# Patient Record
Sex: FEMALE | Race: WHITE | NOT HISPANIC OR LATINO | URBAN - METROPOLITAN AREA
[De-identification: names, ages, dates, MRNs, and addresses within clinical notes are randomized per-mention and may not be internally consistent; named-entity substitution may affect disease eponyms.]

---

## 2018-02-09 ENCOUNTER — INPATIENT (INPATIENT)
Facility: HOSPITAL | Age: 83
LOS: 4 days | Discharge: NURSING, REHAB OR LONG-TERM CARE | DRG: 593 | End: 2018-02-14
Attending: INTERNAL MEDICINE | Admitting: INTERNAL MEDICINE
Payer: COMMERCIAL

## 2018-02-09 VITALS
RESPIRATION RATE: 18 BRPM | DIASTOLIC BLOOD PRESSURE: 56 MMHG | TEMPERATURE: 98 F | OXYGEN SATURATION: 99 % | HEART RATE: 74 BPM | SYSTOLIC BLOOD PRESSURE: 142 MMHG

## 2018-02-09 DIAGNOSIS — L03.90 CELLULITIS, UNSPECIFIED: ICD-10-CM

## 2018-02-09 LAB
ALBUMIN SERPL ELPH-MCNC: 2.6 G/DL — LOW (ref 3.5–5)
ALP SERPL-CCNC: 85 U/L — SIGNIFICANT CHANGE UP (ref 40–120)
ALT FLD-CCNC: 37 U/L DA — SIGNIFICANT CHANGE UP (ref 10–60)
ANION GAP SERPL CALC-SCNC: 5 MMOL/L — SIGNIFICANT CHANGE UP (ref 5–17)
APTT BLD: 34.3 SEC — SIGNIFICANT CHANGE UP (ref 27.5–37.4)
AST SERPL-CCNC: 63 U/L — HIGH (ref 10–40)
BASE EXCESS BLDV CALC-SCNC: 5.9 MMOL/L — HIGH (ref -2–2)
BASOPHILS # BLD AUTO: 0 K/UL — SIGNIFICANT CHANGE UP (ref 0–0.2)
BASOPHILS NFR BLD AUTO: 0.3 % — SIGNIFICANT CHANGE UP (ref 0–2)
BILIRUB SERPL-MCNC: 0.6 MG/DL — SIGNIFICANT CHANGE UP (ref 0.2–1.2)
BLOOD GAS COMMENTS, VENOUS: SIGNIFICANT CHANGE UP
BUN SERPL-MCNC: 43 MG/DL — HIGH (ref 7–18)
CALCIUM SERPL-MCNC: 8.5 MG/DL — SIGNIFICANT CHANGE UP (ref 8.4–10.5)
CHLORIDE SERPL-SCNC: 100 MMOL/L — SIGNIFICANT CHANGE UP (ref 96–108)
CO2 SERPL-SCNC: 33 MMOL/L — HIGH (ref 22–31)
CREAT SERPL-MCNC: 1.45 MG/DL — HIGH (ref 0.5–1.3)
EOSINOPHIL # BLD AUTO: 0 K/UL — SIGNIFICANT CHANGE UP (ref 0–0.5)
EOSINOPHIL NFR BLD AUTO: 0.1 % — SIGNIFICANT CHANGE UP (ref 0–6)
GLUCOSE SERPL-MCNC: 121 MG/DL — HIGH (ref 70–99)
HCO3 BLDV-SCNC: 32 MMOL/L — HIGH (ref 21–29)
HCT VFR BLD CALC: 37.8 % — SIGNIFICANT CHANGE UP (ref 34.5–45)
HGB BLD-MCNC: 11.6 G/DL — SIGNIFICANT CHANGE UP (ref 11.5–15.5)
INR BLD: 1.13 RATIO — SIGNIFICANT CHANGE UP (ref 0.88–1.16)
LACTATE SERPL-SCNC: 2.4 MMOL/L — HIGH (ref 0.7–2)
LIDOCAIN IGE QN: 237 U/L — SIGNIFICANT CHANGE UP (ref 73–393)
LYMPHOCYTES # BLD AUTO: 1.4 K/UL — SIGNIFICANT CHANGE UP (ref 1–3.3)
LYMPHOCYTES # BLD AUTO: 9 % — LOW (ref 13–44)
MCHC RBC-ENTMCNC: 28.6 PG — SIGNIFICANT CHANGE UP (ref 27–34)
MCHC RBC-ENTMCNC: 30.6 GM/DL — LOW (ref 32–36)
MCV RBC AUTO: 93.4 FL — SIGNIFICANT CHANGE UP (ref 80–100)
MONOCYTES # BLD AUTO: 0.7 K/UL — SIGNIFICANT CHANGE UP (ref 0–0.9)
MONOCYTES NFR BLD AUTO: 4.6 % — SIGNIFICANT CHANGE UP (ref 2–14)
NEUTROPHILS # BLD AUTO: 13.7 K/UL — HIGH (ref 1.8–7.4)
NEUTROPHILS NFR BLD AUTO: 86 % — HIGH (ref 43–77)
PCO2 BLDV: 54 MMHG — HIGH (ref 35–50)
PH BLDV: 7.39 — SIGNIFICANT CHANGE UP (ref 7.35–7.45)
PLATELET # BLD AUTO: 197 K/UL — SIGNIFICANT CHANGE UP (ref 150–400)
PO2 BLDV: SIGNIFICANT CHANGE UP MMHG (ref 25–45)
POTASSIUM SERPL-MCNC: 3.8 MMOL/L — SIGNIFICANT CHANGE UP (ref 3.5–5.3)
POTASSIUM SERPL-SCNC: 3.8 MMOL/L — SIGNIFICANT CHANGE UP (ref 3.5–5.3)
PROT SERPL-MCNC: 6.5 G/DL — SIGNIFICANT CHANGE UP (ref 6–8.3)
PROTHROM AB SERPL-ACNC: 12.3 SEC — SIGNIFICANT CHANGE UP (ref 9.8–12.7)
RBC # BLD: 4.04 M/UL — SIGNIFICANT CHANGE UP (ref 3.8–5.2)
RBC # FLD: 13 % — SIGNIFICANT CHANGE UP (ref 10.3–14.5)
SAO2 % BLDV: 36 % — LOW (ref 67–88)
SODIUM SERPL-SCNC: 138 MMOL/L — SIGNIFICANT CHANGE UP (ref 135–145)
TROPONIN I SERPL-MCNC: 0.04 NG/ML — SIGNIFICANT CHANGE UP (ref 0–0.04)
WBC # BLD: 15.9 K/UL — HIGH (ref 3.8–10.5)
WBC # FLD AUTO: 15.9 K/UL — HIGH (ref 3.8–10.5)

## 2018-02-09 PROCEDURE — 73590 X-RAY EXAM OF LOWER LEG: CPT | Mod: 26,LT

## 2018-02-09 PROCEDURE — 71045 X-RAY EXAM CHEST 1 VIEW: CPT | Mod: 26

## 2018-02-09 PROCEDURE — 70450 CT HEAD/BRAIN W/O DYE: CPT | Mod: 26

## 2018-02-09 PROCEDURE — 99285 EMERGENCY DEPT VISIT HI MDM: CPT | Mod: 25

## 2018-02-09 PROCEDURE — 72170 X-RAY EXAM OF PELVIS: CPT | Mod: 26

## 2018-02-09 RX ORDER — SODIUM CHLORIDE 9 MG/ML
1000 INJECTION INTRAMUSCULAR; INTRAVENOUS; SUBCUTANEOUS ONCE
Qty: 0 | Refills: 0 | Status: COMPLETED | OUTPATIENT
Start: 2018-02-09 | End: 2018-02-09

## 2018-02-09 RX ORDER — ACETAMINOPHEN 500 MG
650 TABLET ORAL ONCE
Qty: 0 | Refills: 0 | Status: COMPLETED | OUTPATIENT
Start: 2018-02-09 | End: 2018-02-09

## 2018-02-09 RX ORDER — VANCOMYCIN HCL 1 G
750 VIAL (EA) INTRAVENOUS ONCE
Qty: 0 | Refills: 0 | Status: COMPLETED | OUTPATIENT
Start: 2018-02-09 | End: 2018-02-10

## 2018-02-09 RX ORDER — SODIUM CHLORIDE 9 MG/ML
3 INJECTION INTRAMUSCULAR; INTRAVENOUS; SUBCUTANEOUS ONCE
Qty: 0 | Refills: 0 | Status: COMPLETED | OUTPATIENT
Start: 2018-02-09 | End: 2018-02-09

## 2018-02-09 RX ADMIN — SODIUM CHLORIDE 9999 MILLILITER(S): 9 INJECTION INTRAMUSCULAR; INTRAVENOUS; SUBCUTANEOUS at 22:50

## 2018-02-09 RX ADMIN — SODIUM CHLORIDE 3 MILLILITER(S): 9 INJECTION INTRAMUSCULAR; INTRAVENOUS; SUBCUTANEOUS at 20:40

## 2018-02-09 NOTE — ED PROVIDER NOTE - CONSTITUTIONAL, MLM
normal... Pt is cachetic and dehydrated appearing;  awake, alert, oriented to person, place, time/situation and in no apparent distress.

## 2018-02-09 NOTE — H&P ADULT - EXTREMITIES COMMENTS
bilateral lower extremity warmth and erythema.  L LE cellulitis with draining ulcers in posterior and anterior aspect at the level of tibia and fibula. Sock in place attached to skin, extremely dry.  Onychomicosis of toes with dirt in nailbeds

## 2018-02-09 NOTE — H&P ADULT - PROBLEM SELECTOR PLAN 2
Left leg cellulitis with infected ulcers and blisters.  WBC: 15.9 w/ left shift, lactate. 2.4, afebrile  s/p 1L NS bolus + Vancomycin 750 mg IVPB once  Continue with Vancomycin 1g IVPB q24 hrs  f/up BCx  f/up left tibial XR  f/up TONIE  Podiatry consulted  Will need Vascular consult  ID consult: Left leg cellulitis with infected ulcers and blisters.  WBC: 15.9 w/ left shift, lactate. 2.4, afebrile  s/p 1L NS bolus + Vancomycin 750 mg IVPB once  Continue with Vancomycin 1g IVPB q24 hrs  f/up BCx  f/up left tibial XR  f/up TONIE  Podiatry consulted  ID consulted: Dr De Luna Left leg cellulitis with infected ulcers and blisters.  WBC: 15.9 w/ left shift, lactate. 2.4, afebrile  s/p 1L NS bolus + Vancomycin 750 mg IVPB once  Continue with Vancomycin 1g IVPB q24 hrs + Zosyn for now  f/up BCx  f/up left tibial XR  f/up TONIE  f/up ESR, CRP  Podiatry consulted  ID consulted: Dr De Luna Left leg cellulitis with infected ulcers and blisters.  WBC: 15.9 w/ left shift, lactate. 2.4, afebrile  s/p 1L NS bolus + Vancomycin 750 mg IVPB once  Continue with Vancomycin 1g IVPB q24 hrs + Zosyn for now  f/up BCx  f/up left tibial XR: gas not appreciated, however f/up results  f/up TONIE  f/up ESR, CRP  Podiatry consulted  ID consulted: Dr De Luna

## 2018-02-09 NOTE — H&P ADULT - HISTORY OF PRESENT ILLNESS
Nenita Estrada is an 87F, from home (lives alone), independent at baseline, PMHx HTN, HLD. OA, varicose veins, who presented to ED s/p mechanical fall, as per patient she tripped with the bathroom rug, legs "gave out" and could not stand up due to left leg pain, thereafter, patient screamed for help and daughter came to assist her. Patient denies LOC, chest pain, palpitations, vertigo during fall event.  As per daughter, patient has been limping from left leg for a couple of weeks. They were not "aware" of patients leg infection prior to fall. Patient refers wearing a sock on left foot for "1 week" "because she noticed infection and thought it'll get better with sock". Patient was asked if she washes her legs when showering, to which she was not able to respond clearly. She refers that daughter helps her. Patient denies fever/chills, diarrhea. Refers pain 5/10 left leg. Patient is alert and oriented to time and space but is a poor historian.    Spoke over the phone with daughter, who was not aware of severity of leg infection. She refers that infection is 2/2 fall she had yesterday at home. Daughter was informed that infection most likely has been going on for several weeks now and that patient looks like she has not removed sock for at least 2 weeks, as it was glued to skin and feet with poor hygiene. Daughter refers that patient never reported "leg issues". Daughter denies history of peripheral vascular disease, but does report that patient was once hospitalized for leg infection several years ago.  Upon bedside assessment, patient was poorly groomed, nailbeds with dirt, bilateral lower extremities with erythema, warmth. Left leg with draining ulcers in lateral and posterior side, malodorous, onychomycosis bilaterally, with darkening of skin and dirt. Sock was removed with surgical scissors after soaking with saline water.       ED course: BP:142/56 mmHg, HR: 74 bpm, RR: 18 rpm, T: 97.7F, Sao2: 99% RA  CBC significant for WBC: 15.9 with left shift. Lactate 2.5  s/p 1L NS bolus + Vanco 750mg IVPB once  CT head w/o co: no acute events Nenita Estrada is an 87F, from home (lives alone), independent at baseline, PMHx HTN, HLD. OA, varicose veins, who presented to ED s/p mechanical fall, as per patient she tripped with the bathroom rug, legs "gave out" and could not stand up due to left leg pain, thereafter, patient screamed for help and daughter came to assist her. Patient denies LOC, chest pain, palpitations, vertigo during fall event.  As per daughter, patient has been limping from left leg for a couple of weeks. They were not "aware" of patients leg infection prior to fall. Patient refers wearing a sock on left foot for "1 week" "because she noticed infection and thought it'll get better with sock". Patient was asked if she washes her legs when showering, to which she was not able to respond clearly. She refers that daughter helps her. Patient denies fever/chills, diarrhea. Refers pain 5/10 left leg. Patient is alert and oriented to time and space but is a poor historian.    Spoke over the phone with daughter, who was not aware of severity of leg infection. She refers that infection is 2/2 fall she had yesterday at home. Daughter was informed that infection most likely has been going on for several weeks now and that patient looks like she has not removed sock for at least 2 weeks, as it was glued to skin and feet with poor hygiene. Daughter refers that patient never reported "leg issues". Daughter denies history of peripheral vascular disease, but does report that patient was once hospitalized for leg infection several years ago.  Upon bedside assessment, patient was poorly groomed, nailbeds with dirt, bilateral lower extremities with erythema, warmth. Left leg with draining ulcers in lateral and posterior side, malodorous, onychomycosis bilaterally, with darkening of skin and dirt. Sock was removed with surgical scissors after soaking with saline water.       ED course: BP:142/56 mmHg, HR: 74 bpm, RR: 18 rpm, T: 97.7F, Sao2: 99% RA  CBC significant for WBC: 15.9 with left shift. Lactate 2.5  s/p 1L NS bolus + Vanco 750mg IVPB once  CT head w/o co: no acute events  EKG: NSR VR@70bpm Nenita Estrada is an 87F, from home (lives alone), independent at baseline, PMHx HTN, HLD. OA, varicose veins, who presented to ED s/p mechanical fall, as per patient she tripped with the bathroom rug, legs "gave out" and could not stand up due to left leg pain, thereafter, patient screamed for help and daughter came to assist her. Patient denies LOC, chest pain, palpitations, vertigo during fall event.  As per daughter, patient has been limping from left leg for a couple of days. They were not "aware" of patients leg infection prior to fall. Patient refers wearing a sock on left foot for "1 week" "because she noticed infection and thought it'll get better with sock". Patient was asked if she washes her legs when showering, to which she was not able to respond clearly. She refers that daughter helps her. Patient denies fever/chills, diarrhea. Refers pain 5/10 left leg. Patient is alert and oriented to time and space but is a poor historian.    Spoke over the phone with daughter, who was not aware of severity of leg infection. She refers that infection is 2/2 fall she had yesterday at home. Daughter was informed that infection most likely has been going on for several weeks now and that patient looks like she has not removed sock for at least 2 weeks, as it was glued to skin and feet with poor hygiene. Daughter refers that patient never reported "leg issues". Daughter denies history of peripheral vascular disease, but does report that patient was once hospitalized for leg infection several years ago.  Upon bedside assessment, patient was poorly groomed, nailbeds with dirt, bilateral lower extremities with erythema, warmth. Left leg with draining ulcers in lateral and posterior side, malodorous, onychomycosis bilaterally, with darkening of skin and dirt. Sock was removed with surgical scissors after soaking with saline water.       ED course: BP:142/56 mmHg, HR: 74 bpm, RR: 18 rpm, T: 97.7F, Sao2: 99% RA  CBC significant for WBC: 15.9 with left shift. Lactate 2.5  s/p 1L NS bolus + Vanco 750mg IVPB once  CT head w/o co: no acute events  EKG: NSR VR@70bpm

## 2018-02-09 NOTE — ED PROVIDER NOTE - OBJECTIVE STATEMENT
86 y/o F pt w/ a PMhx of Htn, varicose veins in the legs c/o fall x yesterday. Daughters at the bedside; state that pt has had severe difficulty walking since fall. Report that pt's L leg is covered in sores; believe this is somehow related to the fall. Deny LOC and any other complaints. NKDA.

## 2018-02-09 NOTE — H&P ADULT - ASSESSMENT
Nenita Estrada is an 87F, from home (lives alone), independent at baseline, PMHx HTN, HLD. OA, varicose veins, who presented to ED s/p mechanical fall, as per patient she tripped with the bathroom rug, legs "gave out" and could not stand up due to left leg pain, thereafter, patient screamed for help and daughter came to assist her. Patient denies LOC, chest pain, palpitations, vertigo during fall event.  As per daughter, patient has been limping from left leg for a couple of weeks. They were not "aware" of patients leg infection prior to fall. Patient refers wearing a sock on left foot for "1 week" "because she noticed infection and thought it'll get better with sock". Patient was asked if she washes her legs when showering, to which she was not able to respond clearly. She refers that daughter helps her. Patient denies fever/chills, diarrhea. Refers pain 5/10 left leg. Patient is alert and oriented to time and space but is a poor historian.      Patient admitted for FTT and left leg cellulitis with infected ulcers.

## 2018-02-09 NOTE — H&P ADULT - ATTENDING COMMENTS
Seen and examined in ED with two daughters by bedside. Will do Syncope work up as was  found on floor . Will treat with abxs for left leg  infection . Wound care and ID consults in AM. Will get  to help with safe discharge as lives by herself with daughters checking on her .

## 2018-02-09 NOTE — H&P ADULT - PROBLEM SELECTOR PLAN 1
Patient lives alone, can not take care of self  s/p mechanical fall, found to have lower extremity cellulitis w/ infected ulcers, unable to ambulate  SW consult to evaluate living situation  CT head w/o co: no acute events  f/up pelvic XR Patient lives alone, can not take care of self  s/p mechanical fall, found to have lower extremity cellulitis w/ infected ulcers, unable to ambulate  SW consult to evaluate living situation  CT head w/o co: no acute events  f/up pelvic XR  PT evaluation

## 2018-02-09 NOTE — H&P ADULT - PROBLEM SELECTOR PLAN 3
Lopressor 50 mg BID at home  Holding antihypertensives as blood pressure is borderline low  Monitor blood pressure and start medications if clinically indicated.

## 2018-02-09 NOTE — H&P ADULT - PROBLEM SELECTOR PLAN 4
IMPROVE VTE Individual Risk Assessment          RISK                                                          Points  [  ] Previous VTE                                                3  [  ] Thrombophilia                                             2  [ x ] Lower limb paralysis                                   2        (unable to hold up >15 seconds)    [  ] Current Cancer                                             2         (within 6 months)  [ x ] Immobilization > 24 hrs                              1  [  ] ICU/CCU stay > 24 hours                             1  [ x ] Age > 60                                                         1    IMPROVE VTE Score: 4  HSQ 5KU q8hrs for DVT chemoppx

## 2018-02-10 DIAGNOSIS — Z29.9 ENCOUNTER FOR PROPHYLACTIC MEASURES, UNSPECIFIED: ICD-10-CM

## 2018-02-10 DIAGNOSIS — I10 ESSENTIAL (PRIMARY) HYPERTENSION: ICD-10-CM

## 2018-02-10 DIAGNOSIS — L03.90 CELLULITIS, UNSPECIFIED: ICD-10-CM

## 2018-02-10 DIAGNOSIS — R62.7 ADULT FAILURE TO THRIVE: ICD-10-CM

## 2018-02-10 LAB
ALBUMIN SERPL ELPH-MCNC: 2.1 G/DL — LOW (ref 3.5–5)
ALP SERPL-CCNC: 70 U/L — SIGNIFICANT CHANGE UP (ref 40–120)
ALT FLD-CCNC: 32 U/L DA — SIGNIFICANT CHANGE UP (ref 10–60)
ANION GAP SERPL CALC-SCNC: 9 MMOL/L — SIGNIFICANT CHANGE UP (ref 5–17)
APPEARANCE UR: CLEAR — SIGNIFICANT CHANGE UP
AST SERPL-CCNC: 43 U/L — HIGH (ref 10–40)
BASOPHILS # BLD AUTO: 0 K/UL — SIGNIFICANT CHANGE UP (ref 0–0.2)
BASOPHILS NFR BLD AUTO: 0.3 % — SIGNIFICANT CHANGE UP (ref 0–2)
BILIRUB DIRECT SERPL-MCNC: 0.2 MG/DL — SIGNIFICANT CHANGE UP (ref 0–0.2)
BILIRUB INDIRECT FLD-MCNC: 0.3 MG/DL — SIGNIFICANT CHANGE UP (ref 0.2–1)
BILIRUB SERPL-MCNC: 0.5 MG/DL — SIGNIFICANT CHANGE UP (ref 0.2–1.2)
BILIRUB UR-MCNC: NEGATIVE — SIGNIFICANT CHANGE UP
BUN SERPL-MCNC: 33 MG/DL — HIGH (ref 7–18)
CALCIUM SERPL-MCNC: 7.7 MG/DL — LOW (ref 8.4–10.5)
CHLORIDE SERPL-SCNC: 104 MMOL/L — SIGNIFICANT CHANGE UP (ref 96–108)
CHOLEST SERPL-MCNC: 109 MG/DL — SIGNIFICANT CHANGE UP (ref 10–199)
CO2 SERPL-SCNC: 28 MMOL/L — SIGNIFICANT CHANGE UP (ref 22–31)
COLOR SPEC: YELLOW — SIGNIFICANT CHANGE UP
CREAT SERPL-MCNC: 1.09 MG/DL — SIGNIFICANT CHANGE UP (ref 0.5–1.3)
CRP SERPL-MCNC: 9.7 MG/DL — HIGH (ref 0–0.4)
DIFF PNL FLD: ABNORMAL
EOSINOPHIL # BLD AUTO: 0 K/UL — SIGNIFICANT CHANGE UP (ref 0–0.5)
EOSINOPHIL NFR BLD AUTO: 0 % — SIGNIFICANT CHANGE UP (ref 0–6)
ERYTHROCYTE [SEDIMENTATION RATE] IN BLOOD: 39 MM/HR — HIGH (ref 0–20)
GLUCOSE SERPL-MCNC: 143 MG/DL — HIGH (ref 70–99)
GLUCOSE UR QL: 50 MG/DL
HBA1C BLD-MCNC: 5.7 % — HIGH (ref 4–5.6)
HCT VFR BLD CALC: 30 % — LOW (ref 34.5–45)
HDLC SERPL-MCNC: 53 MG/DL — SIGNIFICANT CHANGE UP (ref 40–125)
HGB BLD-MCNC: 9.6 G/DL — LOW (ref 11.5–15.5)
KETONES UR-MCNC: NEGATIVE — SIGNIFICANT CHANGE UP
LACTATE SERPL-SCNC: 3.1 MMOL/L — HIGH (ref 0.7–2)
LEUKOCYTE ESTERASE UR-ACNC: NEGATIVE — SIGNIFICANT CHANGE UP
LIPID PNL WITH DIRECT LDL SERPL: 45 MG/DL — SIGNIFICANT CHANGE UP
LYMPHOCYTES # BLD AUTO: 0.8 K/UL — LOW (ref 1–3.3)
LYMPHOCYTES # BLD AUTO: 7.9 % — LOW (ref 13–44)
MAGNESIUM SERPL-MCNC: 2.2 MG/DL — SIGNIFICANT CHANGE UP (ref 1.6–2.6)
MCHC RBC-ENTMCNC: 30.1 PG — SIGNIFICANT CHANGE UP (ref 27–34)
MCHC RBC-ENTMCNC: 32.2 GM/DL — SIGNIFICANT CHANGE UP (ref 32–36)
MCV RBC AUTO: 93.6 FL — SIGNIFICANT CHANGE UP (ref 80–100)
MONOCYTES # BLD AUTO: 0.6 K/UL — SIGNIFICANT CHANGE UP (ref 0–0.9)
MONOCYTES NFR BLD AUTO: 6 % — SIGNIFICANT CHANGE UP (ref 2–14)
NEUTROPHILS # BLD AUTO: 9 K/UL — HIGH (ref 1.8–7.4)
NEUTROPHILS NFR BLD AUTO: 85.8 % — HIGH (ref 43–77)
NITRITE UR-MCNC: NEGATIVE — SIGNIFICANT CHANGE UP
PH UR: 5 — SIGNIFICANT CHANGE UP (ref 5–8)
PHOSPHATE SERPL-MCNC: 2.1 MG/DL — LOW (ref 2.5–4.5)
PLATELET # BLD AUTO: 145 K/UL — LOW (ref 150–400)
POTASSIUM SERPL-MCNC: 3.2 MMOL/L — LOW (ref 3.5–5.3)
POTASSIUM SERPL-SCNC: 3.2 MMOL/L — LOW (ref 3.5–5.3)
PROT SERPL-MCNC: 5.5 G/DL — LOW (ref 6–8.3)
PROT UR-MCNC: 30 MG/DL
RBC # BLD: 3.2 M/UL — LOW (ref 3.8–5.2)
RBC # FLD: 12.8 % — SIGNIFICANT CHANGE UP (ref 10.3–14.5)
SODIUM SERPL-SCNC: 141 MMOL/L — SIGNIFICANT CHANGE UP (ref 135–145)
SP GR SPEC: 1.02 — SIGNIFICANT CHANGE UP (ref 1.01–1.02)
TOTAL CHOLESTEROL/HDL RATIO MEASUREMENT: 2.1 RATIO — LOW (ref 3.3–7.1)
TRIGL SERPL-MCNC: 57 MG/DL — SIGNIFICANT CHANGE UP (ref 10–149)
TSH SERPL-MCNC: 2.46 UU/ML — SIGNIFICANT CHANGE UP (ref 0.34–4.82)
UROBILINOGEN FLD QL: NEGATIVE — SIGNIFICANT CHANGE UP
WBC # BLD: 10.6 K/UL — HIGH (ref 3.8–10.5)
WBC # FLD AUTO: 10.6 K/UL — HIGH (ref 3.8–10.5)

## 2018-02-10 RX ORDER — PIPERACILLIN AND TAZOBACTAM 4; .5 G/20ML; G/20ML
3.38 INJECTION, POWDER, LYOPHILIZED, FOR SOLUTION INTRAVENOUS EVERY 12 HOURS
Qty: 0 | Refills: 0 | Status: DISCONTINUED | OUTPATIENT
Start: 2018-02-10 | End: 2018-02-12

## 2018-02-10 RX ORDER — ENOXAPARIN SODIUM 100 MG/ML
40 INJECTION SUBCUTANEOUS DAILY
Qty: 0 | Refills: 0 | Status: DISCONTINUED | OUTPATIENT
Start: 2018-02-10 | End: 2018-02-10

## 2018-02-10 RX ORDER — SODIUM HYPOCHLORITE 0.125 %
1 SOLUTION, NON-ORAL MISCELLANEOUS ONCE
Qty: 0 | Refills: 0 | Status: DISCONTINUED | OUTPATIENT
Start: 2018-02-10 | End: 2018-02-14

## 2018-02-10 RX ORDER — POTASSIUM PHOSPHATE, MONOBASIC POTASSIUM PHOSPHATE, DIBASIC 236; 224 MG/ML; MG/ML
15 INJECTION, SOLUTION INTRAVENOUS ONCE
Qty: 0 | Refills: 0 | Status: COMPLETED | OUTPATIENT
Start: 2018-02-10 | End: 2018-02-10

## 2018-02-10 RX ORDER — METOPROLOL TARTRATE 50 MG
1 TABLET ORAL
Qty: 0 | Refills: 0 | COMMUNITY

## 2018-02-10 RX ORDER — SIMVASTATIN 20 MG/1
20 TABLET, FILM COATED ORAL AT BEDTIME
Qty: 0 | Refills: 0 | Status: DISCONTINUED | OUTPATIENT
Start: 2018-02-10 | End: 2018-02-14

## 2018-02-10 RX ORDER — SIMVASTATIN 20 MG/1
1 TABLET, FILM COATED ORAL
Qty: 0 | Refills: 0 | COMMUNITY

## 2018-02-10 RX ORDER — POTASSIUM CHLORIDE 20 MEQ
40 PACKET (EA) ORAL ONCE
Qty: 0 | Refills: 0 | Status: COMPLETED | OUTPATIENT
Start: 2018-02-10 | End: 2018-02-10

## 2018-02-10 RX ORDER — VANCOMYCIN HCL 1 G
1000 VIAL (EA) INTRAVENOUS EVERY 24 HOURS
Qty: 0 | Refills: 0 | Status: DISCONTINUED | OUTPATIENT
Start: 2018-02-10 | End: 2018-02-14

## 2018-02-10 RX ORDER — ACETAMINOPHEN 500 MG
650 TABLET ORAL EVERY 6 HOURS
Qty: 0 | Refills: 0 | Status: DISCONTINUED | OUTPATIENT
Start: 2018-02-10 | End: 2018-02-14

## 2018-02-10 RX ORDER — PIPERACILLIN AND TAZOBACTAM 4; .5 G/20ML; G/20ML
3.38 INJECTION, POWDER, LYOPHILIZED, FOR SOLUTION INTRAVENOUS ONCE
Qty: 0 | Refills: 0 | Status: COMPLETED | OUTPATIENT
Start: 2018-02-10 | End: 2018-02-10

## 2018-02-10 RX ORDER — SODIUM CHLORIDE 9 MG/ML
1000 INJECTION INTRAMUSCULAR; INTRAVENOUS; SUBCUTANEOUS
Qty: 0 | Refills: 0 | Status: DISCONTINUED | OUTPATIENT
Start: 2018-02-10 | End: 2018-02-12

## 2018-02-10 RX ORDER — HEPARIN SODIUM 5000 [USP'U]/ML
5000 INJECTION INTRAVENOUS; SUBCUTANEOUS EVERY 8 HOURS
Qty: 0 | Refills: 0 | Status: DISCONTINUED | OUTPATIENT
Start: 2018-02-10 | End: 2018-02-14

## 2018-02-10 RX ORDER — ACETAMINOPHEN 500 MG
650 TABLET ORAL EVERY 8 HOURS
Qty: 0 | Refills: 0 | Status: DISCONTINUED | OUTPATIENT
Start: 2018-02-10 | End: 2018-02-14

## 2018-02-10 RX ADMIN — Medication 40 MILLIEQUIVALENT(S): at 08:27

## 2018-02-10 RX ADMIN — SIMVASTATIN 20 MILLIGRAM(S): 20 TABLET, FILM COATED ORAL at 22:23

## 2018-02-10 RX ADMIN — Medication 250 MILLIGRAM(S): at 05:28

## 2018-02-10 RX ADMIN — Medication 250 MILLIGRAM(S): at 02:12

## 2018-02-10 RX ADMIN — PIPERACILLIN AND TAZOBACTAM 200 GRAM(S): 4; .5 INJECTION, POWDER, LYOPHILIZED, FOR SOLUTION INTRAVENOUS at 09:48

## 2018-02-10 RX ADMIN — HEPARIN SODIUM 5000 UNIT(S): 5000 INJECTION INTRAVENOUS; SUBCUTANEOUS at 22:23

## 2018-02-10 RX ADMIN — HEPARIN SODIUM 5000 UNIT(S): 5000 INJECTION INTRAVENOUS; SUBCUTANEOUS at 14:03

## 2018-02-10 RX ADMIN — SODIUM CHLORIDE 75 MILLILITER(S): 9 INJECTION INTRAMUSCULAR; INTRAVENOUS; SUBCUTANEOUS at 05:28

## 2018-02-10 RX ADMIN — POTASSIUM PHOSPHATE, MONOBASIC POTASSIUM PHOSPHATE, DIBASIC 62.5 MILLIMOLE(S): 236; 224 INJECTION, SOLUTION INTRAVENOUS at 10:55

## 2018-02-10 RX ADMIN — HEPARIN SODIUM 5000 UNIT(S): 5000 INJECTION INTRAVENOUS; SUBCUTANEOUS at 05:28

## 2018-02-10 RX ADMIN — Medication 650 MILLIGRAM(S): at 22:54

## 2018-02-10 RX ADMIN — SODIUM CHLORIDE 75 MILLILITER(S): 9 INJECTION INTRAMUSCULAR; INTRAVENOUS; SUBCUTANEOUS at 14:01

## 2018-02-10 RX ADMIN — Medication 650 MILLIGRAM(S): at 01:23

## 2018-02-10 RX ADMIN — PIPERACILLIN AND TAZOBACTAM 25 GRAM(S): 4; .5 INJECTION, POWDER, LYOPHILIZED, FOR SOLUTION INTRAVENOUS at 18:22

## 2018-02-10 NOTE — PHYSICAL THERAPY INITIAL EVALUATION ADULT - RANGE OF MOTION EXAMINATION, REHAB EVAL
UE AROM grossly WFL bilaterally. LE AAROM grossly WFL bilaterally, except L ankle, limited due to dressing

## 2018-02-10 NOTE — CONSULT NOTE ADULT - SUBJECTIVE AND OBJECTIVE BOX
S : 87y year old Female seen at bedside for right leg cellulitis and left leg ulcerations.  Patient relates to falling last week on her bathroom floor and getting abrasions on the LLE. Patient states she did not have any treatments to the abrasion sites and kept her legs covered in socks. Patient does not know if the ulceration grew in size, got worse/better. Patient is a poor historian and states her daughter helps her when she calls her for help. Patient told daughter she fell in the bathroom.     Chief Complaint : Patient is a 87y old  Female who presents with a chief complaint of LLE cellulitis with infected ulcers (09 Feb 2018 23:11)    HPI : HPI:  Nenita Estrada is an 87F, from home (lives alone), independent at baseline, PMHx HTN, HLD. OA, varicose veins, who presented to ED s/p mechanical fall, as per patient she tripped with the bathroom rug, legs "gave out" and could not stand up due to left leg pain, thereafter, patient screamed for help and daughter came to assist her. Patient denies LOC, chest pain, palpitations, vertigo during fall event.  As per daughter, patient has been limping from left leg for a couple of days. They were not "aware" of patients leg infection prior to fall. Patient refers wearing a sock on left foot for "1 week" "because she noticed infection and thought it'll get better with sock". Patient was asked if she washes her legs when showering, to which she was not able to respond clearly. She refers that daughter helps her. Patient denies fever/chills, diarrhea. Refers pain 5/10 left leg. Patient is alert and oriented to time and space but is a poor historian.    Spoke over the phone with daughter, who was not aware of severity of leg infection. She refers that infection is 2/2 fall she had yesterday at home. Daughter was informed that infection most likely has been going on for several weeks now and that patient looks like she has not removed sock for at least 2 weeks, as it was glued to skin and feet with poor hygiene. Daughter refers that patient never reported "leg issues". Daughter denies history of peripheral vascular disease, but does report that patient was once hospitalized for leg infection several years ago.  Upon bedside assessment, patient was poorly groomed, nailbeds with dirt, bilateral lower extremities with erythema, warmth. Left leg with draining ulcers in lateral and posterior side, malodorous, onychomycosis bilaterally, with darkening of skin and dirt. Sock was removed with surgical scissors after soaking with saline water.       ED course: BP:142/56 mmHg, HR: 74 bpm, RR: 18 rpm, T: 97.7F, Sao2: 99% RA  CBC significant for WBC: 15.9 with left shift. Lactate 2.5  s/p 1L NS bolus + Vanco 750mg IVPB once  CT head w/o co: no acute events  EKG: NSR VR@70bpm (09 Feb 2018 23:11)      Patient admits to  (-) Fevers, (-) Chills, (-) Nausea, (-) Vomiting, (-) Shortness of Breath      PMH: No pertinent past medical history  No pertinent past medical history    PSH:No significant past surgical history  No significant past surgical history      Allergies:No Known Allergies      Labs:                          9.6    10.6  )-----------( 145      ( 10 Feb 2018 06:48 )             30.0     WBC Trend  10.6<H> Date (02-10 @ 06:48)  15.9<H> Date (02-09 @ 21:45)      Chem  02-10    141  |  104  |  33<H>  ----------------------------<  143<H>  3.2<L>   |  28  |  1.09    Ca    7.7<L>      10 Feb 2018 06:48  Phos  2.1     02-10  Mg     2.2     02-10    TPro  5.5<L>  /  Alb  2.1<L>  /  TBili  0.5  /  DBili  0.2  /  AST  43<H>  /  ALT  32  /  AlkPhos  70  02-10          T(F): 101.5 (02-10-18 @ 21:55), Max: 101.5 (02-10-18 @ 21:55)  HR: 87 (02-10-18 @ 21:55) (63 - 87)  BP: 140/53 (02-10-18 @ 21:55) (107/42 - 140/53)  RR: 16 (02-10-18 @ 21:55) (15 - 16)  SpO2: 95% (02-10-18 @ 21:55) (95% - 100%)  Wt(kg): --    O:   General: Pleasant  female NAD & AOX3.    Integument:  Skin warm, dry and supple bilateral.    Right extremity erythema extending distal to proximal leg below tibia: +erythema,  no open lesions, dirty noted, hyperkeratotic and mycotic toenails noted bilaterally   Left lower extremity medial and alteral ulcerations mid calf:  - hyperkeratotic border, wound base fibrogranular, + edema, + [place]ison-wound erythema, - purulence, - fluctuance, - tracking/tunneling, - probe to bone.   Vascular: Dorsalis Pedis and Posterior Tibial pulses 1/4.  Capillary re-fill time greater then 3 seconds digits 1-5 bilateral.    Neuro: Protective sensation diminished to the level of the digits bilateral.  MSK: Muscle strength 5/5 all major muscle groups bilateral.  Deformity:  A: BLE cellultiis and LLE ulcerations       P:   Chart reviewed and Patient evaluated  Discussed diagnosis and treatment with patient  Wound flush with Dakins   Need to obtain wound culture to be sent to Pathology  Applied wet to dry dakins soaked guaze on YANIRA with melany   X-rays ordered --  Continue with IV antibiotics As Per ID  Ordered TONIE  WBAT bilaterally   Offloading to bilateral Heels in bed   Discussed importance of daily foot examinations and proper shoe gear and to importance of lower Fasting Blood Glucose levels.   Podiatry will follow while in house.  Discussed with Dr. Huntley

## 2018-02-10 NOTE — PROGRESS NOTE ADULT - SUBJECTIVE AND OBJECTIVE BOX
INTERVAL HPI/OVERNIGHT EVENTS: I feel better.   Vital Signs Last 24 Hrs  T(C): 36.6 (10 Feb 2018 14:26), Max: 37.4 (2018 23:53)  T(F): 97.8 (10 Feb 2018 14:26), Max: 99.3 (2018 23:53)  HR: 85 (10 Feb 2018 14:) (63 - 85)  BP: 125/56 (10 Feb 2018 14:) (107/42 - 142/56)  BP(mean): --  RR: 15 (10 Feb 2018 14:) (15 - 18)  SpO2: 97% (10 Feb 2018 14:) (97% - 100%)  I&O's Summary    MEDICATIONS  (STANDING):  Dakins Solution - 1/2 Strength 1 Application(s) Topical once  heparin  Injectable 5000 Unit(s) SubCutaneous every 8 hours  piperacillin/tazobactam IVPB. 3.375 Gram(s) IV Intermittent every 12 hours  simvastatin 20 milliGRAM(s) Oral at bedtime  sodium chloride 0.9%. 1000 milliLiter(s) (75 mL/Hr) IV Continuous <Continuous>  vancomycin  IVPB 1000 milliGRAM(s) IV Intermittent every 24 hours    MEDICATIONS  (PRN):  acetaminophen   Tablet. 650 milliGRAM(s) Oral every 8 hours PRN Mild Pain (1 - 3)    LABS:                        9.6    10.6  )-----------( 145      ( 10 Feb 2018 06:48 )             30.0     02-10    141  |  104  |  33<H>  ----------------------------<  143<H>  3.2<L>   |  28  |  1.09    Ca    7.7<L>      10 Feb 2018 06:48  Phos  2.1     02-10  Mg     2.2     02-10    TPro  5.5<L>  /  Alb  2.1<L>  /  TBili  0.5  /  DBili  0.2  /  AST  43<H>  /  ALT  32  /  AlkPhos  70  02-10    PT/INR - ( 2018 21:45 )   PT: 12.3 sec;   INR: 1.13 ratio         PTT - ( 2018 21:45 )  PTT:34.3 sec  Urinalysis Basic - ( 2018 23:54 )    Color: Yellow / Appearance: Clear / S.020 / pH: x  Gluc: x / Ketone: Negative  / Bili: Negative / Urobili: Negative   Blood: x / Protein: 30 mg/dL / Nitrite: Negative   Leuk Esterase: Negative / RBC: 10-25 /HPF / WBC 0-2 /HPF   Sq Epi: x / Non Sq Epi: Few /HPF / Bacteria: Moderate /HPF      CAPILLARY BLOOD GLUCOSE            Urinalysis Basic - ( 2018 23:54 )    Color: Yellow / Appearance: Clear / S.020 / pH: x  Gluc: x / Ketone: Negative  / Bili: Negative / Urobili: Negative   Blood: x / Protein: 30 mg/dL / Nitrite: Negative   Leuk Esterase: Negative / RBC: 10-25 /HPF / WBC 0-2 /HPF   Sq Epi: x / Non Sq Epi: Few /HPF / Bacteria: Moderate /HPF      REVIEW OF SYSTEMS:  CONSTITUTIONAL: No fever, weight loss, or fatigue  EYES: No eye pain, visual disturbances, or discharge  ENMT:  No difficulty hearing, tinnitus, vertigo; No sinus or throat pain  NECK: No pain or stiffness  RESPIRATORY: No cough, wheezing, chills or hemoptysis; No shortness of breath  CARDIOVASCULAR: No chest pain, palpitations, dizziness, or leg swelling  GASTROINTESTINAL: No abdominal or epigastric pain. No nausea, vomiting, or hematemesis; No diarrhea or constipation. No melena or hematochezia.  GENITOURINARY: No dysuria, frequency, hematuria, or incontinence    RADIOLOGY & ADDITIONAL TESTS:    Consultant(s) Notes Reviewed:  [x ] YES  [ ] NO    PHYSICAL EXAM:  GENERAL: NAD,  not in any distress ,  HEAD:  Atraumatic, Normocephalic  EYES: EOMI, PERRLA, conjunctiva and sclera clear  ENMT: No tonsillar erythema, exudates, or enlargement; Moist mucous membranes, Good dentition, No lesions  NECK: Supple, No JVD, Normal thyroid  NERVOUS SYSTEM:  Alert & Oriented X3, No focal deficit   CHEST/LUNG: Good air entry bilateral with no  rales, rhonchi, wheezing, or rubs  HEART: Regular rate and rhythm; No murmurs, rubs, or gallops  ABDOMEN: Soft, Nontender, Nondistended; Bowel sounds present  EXTREMITIES:        Care Discussed with Consultants/Other Providers [ x] YES  [ ] NO

## 2018-02-10 NOTE — PHYSICAL THERAPY INITIAL EVALUATION ADULT - GAIT DEVIATIONS NOTED, PT EVAL
decreased velocity of limb motion/decreased j carlos/decreased step length/increased time in double stance

## 2018-02-10 NOTE — PHYSICAL THERAPY INITIAL EVALUATION ADULT - PERTINENT HX OF CURRENT PROBLEM, REHAB EVAL
Per chart review, 88 y/o female presented to ED s/p mechanical fall at home, L LE pain and redness. Head CT (-) for acute infarct of hemorrhage, xray of pelvis, L tibia/fibula were (-) for fx. Pt being treated for cellulitis.

## 2018-02-10 NOTE — PHYSICAL THERAPY INITIAL EVALUATION ADULT - GENERAL OBSERVATIONS, REHAB EVAL
Pt received lying in bed, awake and alert. Reports 5/10 bilateral distal LE pain. (+) R UE IV line, L LE gauze dressing. Pt's daughter, Perla, was at bedside.

## 2018-02-10 NOTE — PHYSICAL THERAPY INITIAL EVALUATION ADULT - IMPAIRMENTS FOUND, PT EVAL
circulation/aerobic capacity/endurance/gait, locomotion, and balance/integumentary integrity/muscle strength

## 2018-02-10 NOTE — PHYSICAL THERAPY INITIAL EVALUATION ADULT - LIVES WITH, PROFILE
alone/Pt lives alone in a 2nd floor apt of a pvt house. Her daughter resides on 1st floor. Has [address], 10-12 steps to her apt. Pt was independent, not using ambulation device prior to admission. Recent difficulty ambulating 1 week prior to admission. Ambulates to senior center (5 blocks away) 5 days/week

## 2018-02-10 NOTE — PHYSICAL THERAPY INITIAL EVALUATION ADULT - CRITERIA FOR SKILLED THERAPEUTIC INTERVENTIONS
anticipated discharge recommendation/impairments found/therapy frequency/functional limitations in following categories/rehab potential/predicted duration of therapy intervention

## 2018-02-11 LAB
ANION GAP SERPL CALC-SCNC: 5 MMOL/L — SIGNIFICANT CHANGE UP (ref 5–17)
BASOPHILS # BLD AUTO: 0.1 K/UL — SIGNIFICANT CHANGE UP (ref 0–0.2)
BASOPHILS NFR BLD AUTO: 0.6 % — SIGNIFICANT CHANGE UP (ref 0–2)
BUN SERPL-MCNC: 22 MG/DL — HIGH (ref 7–18)
CALCIUM SERPL-MCNC: 8.2 MG/DL — LOW (ref 8.4–10.5)
CHLORIDE SERPL-SCNC: 107 MMOL/L — SIGNIFICANT CHANGE UP (ref 96–108)
CO2 SERPL-SCNC: 30 MMOL/L — SIGNIFICANT CHANGE UP (ref 22–31)
CREAT SERPL-MCNC: 0.96 MG/DL — SIGNIFICANT CHANGE UP (ref 0.5–1.3)
EOSINOPHIL # BLD AUTO: 0 K/UL — SIGNIFICANT CHANGE UP (ref 0–0.5)
EOSINOPHIL NFR BLD AUTO: 0.2 % — SIGNIFICANT CHANGE UP (ref 0–6)
GLUCOSE SERPL-MCNC: 93 MG/DL — SIGNIFICANT CHANGE UP (ref 70–99)
HCT VFR BLD CALC: 33.5 % — LOW (ref 34.5–45)
HGB BLD-MCNC: 10 G/DL — LOW (ref 11.5–15.5)
LYMPHOCYTES # BLD AUTO: 1.1 K/UL — SIGNIFICANT CHANGE UP (ref 1–3.3)
LYMPHOCYTES # BLD AUTO: 12.7 % — LOW (ref 13–44)
MAGNESIUM SERPL-MCNC: 2.2 MG/DL — SIGNIFICANT CHANGE UP (ref 1.6–2.6)
MCHC RBC-ENTMCNC: 28 PG — SIGNIFICANT CHANGE UP (ref 27–34)
MCHC RBC-ENTMCNC: 29.9 GM/DL — LOW (ref 32–36)
MCV RBC AUTO: 93.7 FL — SIGNIFICANT CHANGE UP (ref 80–100)
MONOCYTES # BLD AUTO: 0.6 K/UL — SIGNIFICANT CHANGE UP (ref 0–0.9)
MONOCYTES NFR BLD AUTO: 6.7 % — SIGNIFICANT CHANGE UP (ref 2–14)
NEUTROPHILS # BLD AUTO: 6.9 K/UL — SIGNIFICANT CHANGE UP (ref 1.8–7.4)
NEUTROPHILS NFR BLD AUTO: 79.7 % — HIGH (ref 43–77)
PHOSPHATE SERPL-MCNC: 2.1 MG/DL — LOW (ref 2.5–4.5)
PLATELET # BLD AUTO: 153 K/UL — SIGNIFICANT CHANGE UP (ref 150–400)
POTASSIUM SERPL-MCNC: 3.6 MMOL/L — SIGNIFICANT CHANGE UP (ref 3.5–5.3)
POTASSIUM SERPL-SCNC: 3.6 MMOL/L — SIGNIFICANT CHANGE UP (ref 3.5–5.3)
RBC # BLD: 3.58 M/UL — LOW (ref 3.8–5.2)
RBC # FLD: 13.2 % — SIGNIFICANT CHANGE UP (ref 10.3–14.5)
SODIUM SERPL-SCNC: 142 MMOL/L — SIGNIFICANT CHANGE UP (ref 135–145)
WBC # BLD: 8.7 K/UL — SIGNIFICANT CHANGE UP (ref 3.8–10.5)
WBC # FLD AUTO: 8.7 K/UL — SIGNIFICANT CHANGE UP (ref 3.8–10.5)

## 2018-02-11 RX ADMIN — Medication 650 MILLIGRAM(S): at 12:31

## 2018-02-11 RX ADMIN — PIPERACILLIN AND TAZOBACTAM 25 GRAM(S): 4; .5 INJECTION, POWDER, LYOPHILIZED, FOR SOLUTION INTRAVENOUS at 17:16

## 2018-02-11 RX ADMIN — HEPARIN SODIUM 5000 UNIT(S): 5000 INJECTION INTRAVENOUS; SUBCUTANEOUS at 13:36

## 2018-02-11 RX ADMIN — HEPARIN SODIUM 5000 UNIT(S): 5000 INJECTION INTRAVENOUS; SUBCUTANEOUS at 23:02

## 2018-02-11 RX ADMIN — PIPERACILLIN AND TAZOBACTAM 25 GRAM(S): 4; .5 INJECTION, POWDER, LYOPHILIZED, FOR SOLUTION INTRAVENOUS at 06:05

## 2018-02-11 RX ADMIN — Medication 650 MILLIGRAM(S): at 11:58

## 2018-02-11 RX ADMIN — HEPARIN SODIUM 5000 UNIT(S): 5000 INJECTION INTRAVENOUS; SUBCUTANEOUS at 06:05

## 2018-02-11 RX ADMIN — Medication 250 MILLIGRAM(S): at 04:38

## 2018-02-11 RX ADMIN — SIMVASTATIN 20 MILLIGRAM(S): 20 TABLET, FILM COATED ORAL at 23:02

## 2018-02-11 NOTE — PROGRESS NOTE ADULT - PROBLEM SELECTOR PLAN 2
Left leg cellulitis with infected ulcers and blisters.  - WBC 15.9 w/ left shift improved to 11K but lactate 2.4 persists to 3.1, ESR elevated 30s; afebrile  - Continue with Vancomycin 1g IVPB q24 hrs + Zosyn for now  ***F/u BCx, TONIE  ID Dr. De Luna  Podiatry Dr. Jones Left leg cellulitis with infected ulcers and blisters.  - WBC 15.9 w/ left shift improved to 8.7 (small L shift) but lactate 2.4 persists to 3.1, ESR elevated 30s; fever 38.4 C  - Continue with Vancomycin 1g IVPB q24 hrs + Zosyn for now  ***F/u BCx, TONIE  ID Dr. De Luna; Vancomycin and Zoysn / trough in AM  Podiatry Dr. Jones

## 2018-02-11 NOTE — PROGRESS NOTE ADULT - PROBLEM SELECTOR PLAN 1
Patient lives alone, can not take care of self and s/p mechanical fall, found to have lower extremity cellulitis w/ infected ulcers and unable to ambulate  - PT recommends ROMI  - CT head, XR pelvic, and L tib/fib negative  ***SW consult to evaluate living situation

## 2018-02-11 NOTE — CONSULT NOTE ADULT - SUBJECTIVE AND OBJECTIVE BOX
HPI:  Nenita Estrada is an 87F, from home (lives alone), independent at baseline, PMHx HTN, HLD. OA, varicose veins, who presented to ED s/p mechanical fall, as per patient she tripped with the bathroom rug, legs "gave out" and could not stand up due to left leg pain, thereafter, patient screamed for help and daughter came to assist her. Patient denies LOC, chest pain, palpitations, vertigo during fall event.  As per daughter, patient has been limping from left leg for a couple of days. They were not "aware" of patients leg infection prior to fall. Patient refers wearing a sock on left foot for "1 week" "because she noticed infection and thought it'll get better with sock". Patient was asked if she washes her legs when showering, to which she was not able to respond clearly. She refers that daughter helps her. Patient denies fever/chills, diarrhea. Refers pain 5/10 left leg. Patient is alert and oriented to time and space but is a poor historian.    Spoke over the phone with daughter, who was not aware of severity of leg infection. She refers that infection is 2/2 fall she had yesterday at home. Daughter was informed that infection most likely has been going on for several weeks now and that patient looks like she has not removed sock for at least 2 weeks, as it was glued to skin and feet with poor hygiene. Daughter refers that patient never reported "leg issues". Daughter denies history of peripheral vascular disease, but does report that patient was once hospitalized for leg infection several years ago.  Upon bedside assessment, patient was poorly groomed, nailbeds with dirt, bilateral lower extremities with erythema, warmth. Left leg with draining ulcers in lateral and posterior side, malodorous, onychomycosis bilaterally, with darkening of skin and dirt. Sock was removed with surgical scissors after soaking with saline water.       ED course: BP:142/56 mmHg, HR: 74 bpm, RR: 18 rpm, T: 97.7F, Sao2: 99% RA  CBC significant for WBC: 15.9 with left shift. Lactate 2.5  s/p 1L NS bolus + Vanco 750mg IVPB once  CT head w/o co: no acute events  EKG: NSR VR@70bpm (2018 23:11)      PAST MEDICAL & SURGICAL HISTORY:  No pertinent past medical history  No significant past surgical history      No Known Allergies      Meds:  acetaminophen   Tablet 650 milliGRAM(s) Oral every 6 hours PRN  acetaminophen   Tablet. 650 milliGRAM(s) Oral every 8 hours PRN  Dakins Solution - 1/2 Strength 1 Application(s) Topical once  heparin  Injectable 5000 Unit(s) SubCutaneous every 8 hours  piperacillin/tazobactam IVPB. 3.375 Gram(s) IV Intermittent every 12 hours  simvastatin 20 milliGRAM(s) Oral at bedtime  sodium chloride 0.9%. 1000 milliLiter(s) IV Continuous <Continuous>  vancomycin  IVPB 1000 milliGRAM(s) IV Intermittent every 24 hours      SOCIAL HISTORY:  Smoker:  YES / NO        PACK YEARS:                         WHEN QUIT?  ETOH use:  YES / NO               FREQUENCY / QUANTITY:  Ilicit Drug use:  YES / NO  Occupation:  Assisted device use (Cane / Walker):  Live with:    FAMILY HISTORY:      VITALS:  Vital Signs Last 24 Hrs  T(C): 36.9 (2018 14:27), Max: 38.6 (10 Feb 2018 21:55)  T(F): 98.4 (2018 14:27), Max: 101.5 (10 Feb 2018 21:55)  HR: 93 (2018 14:27) (77 - 93)  BP: 113/61 (2018 14:27) (113/61 - 140/53)  BP(mean): --  RR: 16 (2018 14:27) (16 - 16)  SpO2: 99% (2018 14:27) (95% - 99%)    LABS/DIAGNOSTIC TESTS:                          10.0   8.7   )-----------( 153      ( 2018 06:33 )             33.5     WBC Count: 8.7 K/uL ( @ 06:33)  WBC Count: 10.6 K/uL (02-10 @ 06:48)  WBC Count: 15.9 K/uL ( @ 21:45)          142  |  107  |  22<H>  ----------------------------<  93  3.6   |  30  |  0.96    Ca    8.2<L>      2018 06:33  Phos  2.1     02-11  Mg     2.2     -11    TPro  5.5<L>  /  Alb  2.1<L>  /  TBili  0.5  /  DBili  0.2  /  AST  43<H>  /  ALT  32  /  AlkPhos  70  02-10      Urinalysis Basic - ( 2018 23:54 )    Color: Yellow / Appearance: Clear / S.020 / pH: x  Gluc: x / Ketone: Negative  / Bili: Negative / Urobili: Negative   Blood: x / Protein: 30 mg/dL / Nitrite: Negative   Leuk Esterase: Negative / RBC: 10-25 /HPF / WBC 0-2 /HPF   Sq Epi: x / Non Sq Epi: Few /HPF / Bacteria: Moderate /HPF        LIVER FUNCTIONS - ( 10 Feb 2018 10:26 )  Alb: 2.1 g/dL / Pro: 5.5 g/dL / ALK PHOS: 70 U/L / ALT: 32 U/L DA / AST: 43 U/L / GGT: x             PT/INR - ( 2018 21:45 )   PT: 12.3 sec;   INR: 1.13 ratio         PTT - ( 2018 21:45 )  PTT:34.3 sec    LACTATE:    ABG -     CULTURES:       RADIOLOGY:      ROS  [  ] UNABLE TO ELICIT HPI:  Nenita Estrada is an 87F, from home (lives alone), independent at baseline, PMHx HTN, HLD. OA, varicose veins, who presented to ED s/p mechanical fall, as per patient she tripped with the bathroom rug, legs "gave out" and could not stand up due to left leg pain, thereafter, patient screamed for help and daughter came to assist her. Patient denies LOC, chest pain, palpitations, vertigo during fall event.          PAST MEDICAL & SURGICAL HISTORY:  No pertinent past medical history  No significant past surgical history      No Known Allergies      Meds:  acetaminophen   Tablet 650 milliGRAM(s) Oral every 6 hours PRN  acetaminophen   Tablet. 650 milliGRAM(s) Oral every 8 hours PRN  Dakins Solution - 1/2 Strength 1 Application(s) Topical once  heparin  Injectable 5000 Unit(s) SubCutaneous every 8 hours  piperacillin/tazobactam IVPB. 3.375 Gram(s) IV Intermittent every 12 hours  simvastatin 20 milliGRAM(s) Oral at bedtime  sodium chloride 0.9%. 1000 milliLiter(s) IV Continuous <Continuous>  vancomycin  IVPB 1000 milliGRAM(s) IV Intermittent every 24 hours      SOCIAL HISTORY:  Smoker:    ETOH use:      FAMILY HISTORY:      VITALS:  Vital Signs Last 24 Hrs  T(C): 36.9 (2018 14:27), Max: 38.6 (10 Feb 2018 21:55)  T(F): 98.4 (2018 14:27), Max: 101.5 (10 Feb 2018 21:55)  HR: 93 (2018 14:27) (77 - 93)  BP: 113/61 (2018 14:27) (113/61 - 140/53)  BP(mean): --  RR: 16 (2018 14:27) (16 - 16)  SpO2: 99% (2018 14:27) (95% - 99%)    LABS/DIAGNOSTIC TESTS:                          10.0   8.7   )-----------( 153      ( 2018 06:33 )             33.5     WBC Count: 8.7 K/uL ( @ 06:33)  WBC Count: 10.6 K/uL (02-10 @ 06:48)  WBC Count: 15.9 K/uL ( @ 21:45)          142  |  107  |  22<H>  ----------------------------<  93  3.6   |  30  |  0.96    Ca    8.2<L>      2018 06:33  Phos  2.1       Mg     2.2         TPro  5.5<L>  /  Alb  2.1<L>  /  TBili  0.5  /  DBili  0.2  /  AST  43<H>  /  ALT  32  /  AlkPhos  70  02-10      Urinalysis Basic - ( 2018 23:54 )    Color: Yellow / Appearance: Clear / S.020 / pH: x  Gluc: x / Ketone: Negative  / Bili: Negative / Urobili: Negative   Blood: x / Protein: 30 mg/dL / Nitrite: Negative   Leuk Esterase: Negative / RBC: 10-25 /HPF / WBC 0-2 /HPF   Sq Epi: x / Non Sq Epi: Few /HPF / Bacteria: Moderate /HPF        LIVER FUNCTIONS - ( 10 Feb 2018 10:26 )  Alb: 2.1 g/dL / Pro: 5.5 g/dL / ALK PHOS: 70 U/L / ALT: 32 U/L DA / AST: 43 U/L / GGT: x             PT/INR - ( 2018 21:45 )   PT: 12.3 sec;   INR: 1.13 ratio         PTT - ( 2018 21:45 )  PTT:34.3 sec    LACTATE:    ABG -     CULTURES:       RADIOLOGY:< from: Xray Tibia + Fibula 2 Views, Left (18 @ 21:07) >  EXAM:  LEG AP&LAT - LEFT                            PROCEDURE DATE:  2018          INTERPRETATION:  Radiographs of the  left tibia and fibula          CLINICAL INFORMATION:  Swollen left leg. Status post fall.    TECHNIQUE:  Frontal and lateral views of the tibia and fibula were   obtained.    FINDINGS:  No prior studies are  available for review.    The proximal tibia and fibula appear unremarkable. No gross distal tibia   and fibula fracture is seen.   No destructive lesion is seen. Small soft   tissue calcifications are present. There is soft tissue swelling of the   ankle.    IMPRESSION : No gross fracture. Limited evaluation of the ankle   Radiographic evaluation of the ankle is recommended if clinically   indicated.          EXAM:  XR CHEST AP OR PA 1V                            PROCEDURE DATE:  2018          INTERPRETATION:  Chest radiograph (one view)          CLINICAL INFORMATION:  Status post fall. The patient is unable to   communicate.    TECHNIQUE:  Singlefrontal view of the chest was obtained.    FINDINGS:  No previous examinations are available for review.    The lungs are free of infiltrate. A density is projecting over the   retrocardiac left lower lung.  No pleural abnormality is seen.     The heart appears prominent, possibly due to the projection. The right   hilum and superior mediastinum appear unremarkable. The left hilum   appears prominent.    Degenerative changes are seen in the spine.    IMPRESSION: Retrocardiac left lower lobe opacity. Prominent left hilum. A   nonemergent CT scan of the chest is recommended.      < end of copied text >      ROS  [  ] UNABLE TO ELICIT HPI:  Nenita Estrada is an 87F, from home (lives alone), independent at baseline, PMHx HTN, HLD. OA, varicose veins, who presented to ED s/p mechanical fall, as per patient she tripped with the bathroom rug, legs "gave out" and could not stand up due to left leg pain, thereafter, patient screamed for help and daughter came to assist her. Patient denies LOC, chest pain, palpitations, vertigo during fall event.  The pt is alert, and oriented but not a very good historian. she has pain in her left leg where she has ulcers of her lower leg, her right leg is the one with cellulitis. Her skin shows her to be very unkept. she denies any cough , sob or chest pain.          PAST MEDICAL & SURGICAL HISTORY:  No pertinent past medical history  No significant past surgical history      No Known Allergies      Meds:  acetaminophen   Tablet 650 milliGRAM(s) Oral every 6 hours PRN  acetaminophen   Tablet. 650 milliGRAM(s) Oral every 8 hours PRN  Dakins Solution - 1/2 Strength 1 Application(s) Topical once  heparin  Injectable 5000 Unit(s) SubCutaneous every 8 hours  piperacillin/tazobactam IVPB. 3.375 Gram(s) IV Intermittent every 12 hours  simvastatin 20 milliGRAM(s) Oral at bedtime  sodium chloride 0.9%. 1000 milliLiter(s) IV Continuous <Continuous>  vancomycin  IVPB 1000 milliGRAM(s) IV Intermittent every 24 hours      SOCIAL HISTORY:  Smoker:  no  ETOH use:  no    FAMILY HISTORY: unknown      VITALS:  Vital Signs Last 24 Hrs  T(C): 36.9 (2018 14:27), Max: 38.6 (10 Feb 2018 21:55)  T(F): 98.4 (2018 14:27), Max: 101.5 (10 Feb 2018 21:55)  HR: 93 (2018 14:27) (77 - 93)  BP: 113/61 (2018 14:27) (113/61 - 140/53)  BP(mean): --  RR: 16 (2018 14:27) (16 - 16)  SpO2: 99% (2018 14:) (95% - 99%)    LABS/DIAGNOSTIC TESTS:                          10.0   8.7   )-----------( 153      ( 2018 06:33 )             33.5     WBC Count: 8.7 K/uL ( @ 06:33)  WBC Count: 10.6 K/uL (02-10 @ 06:48)  WBC Count: 15.9 K/uL ( @ 21:45)          142  |  107  |  22<H>  ----------------------------<  93  3.6   |  30  |  0.96    Ca    8.2<L>      2018 06:33  Phos  2.1       Mg     2.2         TPro  5.5<L>  /  Alb  2.1<L>  /  TBili  0.5  /  DBili  0.2  /  AST  43<H>  /  ALT  32  /  AlkPhos  70  02-10      Urinalysis Basic - ( 2018 23:54 )    Color: Yellow / Appearance: Clear / S.020 / pH: x  Gluc: x / Ketone: Negative  / Bili: Negative / Urobili: Negative   Blood: x / Protein: 30 mg/dL / Nitrite: Negative   Leuk Esterase: Negative / RBC: 10-25 /HPF / WBC 0-2 /HPF   Sq Epi: x / Non Sq Epi: Few /HPF / Bacteria: Moderate /HPF        LIVER FUNCTIONS - ( 10 Feb 2018 10:26 )  Alb: 2.1 g/dL / Pro: 5.5 g/dL / ALK PHOS: 70 U/L / ALT: 32 U/L DA / AST: 43 U/L / GGT: x             PT/INR - ( 2018 21:45 )   PT: 12.3 sec;   INR: 1.13 ratio         PTT - ( 2018 21:45 )  PTT:34.3 sec    LACTATE:    ABG -     CULTURES:       RADIOLOGY:< from: Xray Tibia + Fibula 2 Views, Left (18 @ 21:07) >  EXAM:  LEG AP&LAT - LEFT                            PROCEDURE DATE:  2018          INTERPRETATION:  Radiographs of the  left tibia and fibula          CLINICAL INFORMATION:  Swollen left leg. Status post fall.    TECHNIQUE:  Frontal and lateral views of the tibia and fibula were   obtained.    FINDINGS:  No prior studies are  available for review.    The proximal tibia and fibula appear unremarkable. No gross distal tibia   and fibula fracture is seen.   No destructive lesion is seen. Small soft   tissue calcifications are present. There is soft tissue swelling of the   ankle.    IMPRESSION : No gross fracture. Limited evaluation of the ankle   Radiographic evaluation of the ankle is recommended if clinically   indicated.          EXAM:  XR CHEST AP OR PA 1V                            PROCEDURE DATE:  2018          INTERPRETATION:  Chest radiograph (one view)          CLINICAL INFORMATION:  Status post fall. The patient is unable to   communicate.    TECHNIQUE:  Singlefrontal view of the chest was obtained.    FINDINGS:  No previous examinations are available for review.    The lungs are free of infiltrate. A density is projecting over the   retrocardiac left lower lung.  No pleural abnormality is seen.     The heart appears prominent, possibly due to the projection. The right   hilum and superior mediastinum appear unremarkable. The left hilum   appears prominent.    Degenerative changes are seen in the spine.    IMPRESSION: Retrocardiac left lower lobe opacity. Prominent left hilum. A   nonemergent CT scan of the chest is recommended.      < end of copied text >      ROS  [  ] UNABLE TO ELICIT

## 2018-02-11 NOTE — CONSULT NOTE ADULT - ASSESSMENT
right leg cellulitis  fever  leukocytosis - which has normalized  no clinical evidence of pneumonia and on reviewing CXR I don't see any infiltrate    plan - cont vancomycin  1gm iv q24hrs  scarlett caballeron

## 2018-02-11 NOTE — PROGRESS NOTE ADULT - SUBJECTIVE AND OBJECTIVE BOX
PGY 1 Note discussed with supervising resident and primary attending    Patient is a 87y old  Female who presents with a chief complaint of LLE cellulitis with infected ulcers (2018 23:11)      INTERVAL HPI/OVERNIGHT EVENTS: Patient seen and examined at bedside with no new complaints     MEDICATIONS  (STANDING):  Dakins Solution - 1/2 Strength 1 Application(s) Topical once  heparin  Injectable 5000 Unit(s) SubCutaneous every 8 hours  piperacillin/tazobactam IVPB. 3.375 Gram(s) IV Intermittent every 12 hours  simvastatin 20 milliGRAM(s) Oral at bedtime  sodium chloride 0.9%. 1000 milliLiter(s) (75 mL/Hr) IV Continuous <Continuous>  vancomycin  IVPB 1000 milliGRAM(s) IV Intermittent every 24 hours    MEDICATIONS  (PRN):  acetaminophen   Tablet 650 milliGRAM(s) Oral every 6 hours PRN For Temp greater than 38 C (100.4 F)  acetaminophen   Tablet. 650 milliGRAM(s) Oral every 8 hours PRN Mild Pain (1 - 3)      __________________________________________________  REVIEW OF SYSTEMS:    CONSTITUTIONAL: No fever,   EYES: No acute visual disturbances  NECK: No pain or stiffness  RESPIRATORY: No cough; No shortness of breath  CARDIOVASCULAR: No chest pain, no palpitations  GASTROINTESTINAL: No pain. No nausea or vomiting; No diarrhea   NEUROLOGICAL: No headache or numbness, no tremors  MUSCULOSKELETAL: No joint pain, no muscle pain  GENITOURINARY: No dysuria, no frequency, no hesitancy  PSYCHIATRY: No depression , no anxiety  ALL OTHER  ROS negative        Vital Signs Last 24 Hrs  T(C): 36.9 (2018 04:45), Max: 38.6 (10 Feb 2018 21:55)  T(F): 98.4 (2018 04:45), Max: 101.5 (10 Feb 2018 21:55)  HR: 77 (2018 04:45) (77 - 87)  BP: 127/62 (2018 04:45) (116/81 - 140/53)  BP(mean): --  RR: 16 (2018 04:45) (15 - 16)  SpO2: 98% (2018 04:45) (95% - 98%)    ________________________________________________  PHYSICAL EXAM:  GENERAL: NAD  HEENT: Normocephalic;  conjunctivae and sclerae clear; moist mucous membranes;   NECK : supple  CHEST/LUNG: Clear to auscultation bilaterally with good air entry   HEART: S1 S2  regular; no murmurs, gallops or rubs  ABDOMEN: Soft, Nontender, Nondistended; Bowel sounds present  EXTREMITIES: b/l LE edema and LLE cellulitis w/ mild tenderness  NERVOUS SYSTEM:  Awake and alert; Oriented  to place, person and time ; no new deficits    _________________________________________________  LABS:                        9.6    10.6  )-----------( 145      ( 10 Feb 2018 06:48 )             30.0     02-10    141  |  104  |  33<H>  ----------------------------<  143<H>  3.2<L>   |  28  |  1.09    Ca    7.7<L>      10 Feb 2018 06:48  Phos  2.1     02-10  Mg     2.2     02-10    TPro  5.5<L>  /  Alb  2.1<L>  /  TBili  0.5  /  DBili  0.2  /  AST  43<H>  /  ALT  32  /  AlkPhos  70  02-10    PT/INR - ( 2018 21:45 )   PT: 12.3 sec;   INR: 1.13 ratio         PTT - ( 2018 21:45 )  PTT:34.3 sec  Urinalysis Basic - ( 2018 23:54 )    Color: Yellow / Appearance: Clear / S.020 / pH: x  Gluc: x / Ketone: Negative  / Bili: Negative / Urobili: Negative   Blood: x / Protein: 30 mg/dL / Nitrite: Negative   Leuk Esterase: Negative / RBC: 10-25 /HPF / WBC 0-2 /HPF   Sq Epi: x / Non Sq Epi: Few /HPF / Bacteria: Moderate /HPF      CAPILLARY BLOOD GLUCOSE            RADIOLOGY & ADDITIONAL TESTS:    Imaging Personally Reviewed:  YES    Consultant(s) Notes Reviewed:   YES    Care Discussed with Consultants : YES    Plan of care was discussed with patient and /or primary care giver; all questions and concerns were addressed and care was aligned with patient's wishes.

## 2018-02-12 LAB — VANCOMYCIN TROUGH SERPL-MCNC: 9.1 UG/ML — LOW (ref 10–20)

## 2018-02-12 RX ORDER — COLLAGENASE CLOSTRIDIUM HIST. 250 UNIT/G
1 OINTMENT (GRAM) TOPICAL ONCE
Qty: 0 | Refills: 0 | Status: DISCONTINUED | OUTPATIENT
Start: 2018-02-12 | End: 2018-02-14

## 2018-02-12 RX ADMIN — HEPARIN SODIUM 5000 UNIT(S): 5000 INJECTION INTRAVENOUS; SUBCUTANEOUS at 05:43

## 2018-02-12 RX ADMIN — Medication 250 MILLIGRAM(S): at 05:49

## 2018-02-12 RX ADMIN — SIMVASTATIN 20 MILLIGRAM(S): 20 TABLET, FILM COATED ORAL at 23:00

## 2018-02-12 RX ADMIN — Medication 650 MILLIGRAM(S): at 05:50

## 2018-02-12 RX ADMIN — HEPARIN SODIUM 5000 UNIT(S): 5000 INJECTION INTRAVENOUS; SUBCUTANEOUS at 23:00

## 2018-02-12 RX ADMIN — HEPARIN SODIUM 5000 UNIT(S): 5000 INJECTION INTRAVENOUS; SUBCUTANEOUS at 15:00

## 2018-02-12 RX ADMIN — PIPERACILLIN AND TAZOBACTAM 25 GRAM(S): 4; .5 INJECTION, POWDER, LYOPHILIZED, FOR SOLUTION INTRAVENOUS at 05:43

## 2018-02-12 NOTE — PROGRESS NOTE ADULT - PROBLEM SELECTOR PLAN 2
Left leg cellulitis with infected ulcers and blisters.  - WBC 15.9 w/ left shift improved to 8.7 (small L shift) but lactate 2.4 persists to 3.1, ESR elevated 30s; fever 38.4 C  - Continue with Vancomycin 1g IVPB q24 hrs + Zosyn for now  ***F/u BCx, TONIE  ID Dr. De Luna; Vancomycin and Zoysn / trough in AM  Podiatry Dr. Jones Left leg cellulitis with infected ulcers and blisters.  -leucocytosis resolved, afebrile,    ESR elevated 30s; fever 38.4 C  - Continue with Vancomycin 1g IVPB q24 hrs -dc zosyn  ***F/u BCx, TONIE  ID Dr. De Luna; Vancomycin   trough today 9  Podiatry Dr. Jones following

## 2018-02-12 NOTE — ADVANCED PRACTICE NURSE CONSULT - ASSESSMENT
This is a 87yr old female patient admitted for Cellulitis, presenting with Bilateral Lower Leg Ulcerations, to which the patients is being followed by Podiatry with a treatment plan in place to address the patients current issues. There is no further need for wound care consultation at this time.

## 2018-02-12 NOTE — CONSULT NOTE ADULT - SUBJECTIVE AND OBJECTIVE BOX
HISTORY OF PRESENT ILLNESS: HPI:  Nenita Estrada is an 87F, from home (lives alone), independent at baseline, PMHx HTN, HLD. OA, varicose veins, who presented to ED s/p mechanical fall, as per patient she tripped with the bathroom rug, legs "gave out" and could not stand up due to left leg pain, thereafter, patient screamed for help and daughter came to assist her. Patient denies LOC, chest pain, palpitations, vertigo during fall event.  As per daughter, patient has been limping from left leg for a couple of days. They were not "aware" of patients leg infection prior to fall. Patient refers wearing a sock on left foot for "1 week" "because she noticed infection and thought it'll get better with sock". Patient was asked if she washes her legs when showering, to which she was not able to respond clearly. She refers that daughter helps her. Patient denies fever/chills, diarrhea. Refers pain 5/10 left leg. Patient is alert and oriented to time and space but is a poor historian.    Spoke over the phone with daughter, who was not aware of severity of leg infection. She refers that infection is 2/2 fall she had yesterday at home. Daughter was informed that infection most likely has been going on for several weeks now and that patient looks like she has not removed sock for at least 2 weeks, as it was glued to skin and feet with poor hygiene. Daughter refers that patient never reported "leg issues". Daughter denies history of peripheral vascular disease, but does report that patient was once hospitalized for leg infection several years ago.  Upon bedside assessment, patient was poorly groomed, nailbeds with dirt, bilateral lower extremities with erythema, warmth. Left leg with draining ulcers in lateral and posterior side, malodorous, onychomycosis bilaterally, with darkening of skin and dirt. Sock was removed with surgical scissors after soaking with saline water.       ED course: BP:142/56 mmHg, HR: 74 bpm, RR: 18 rpm, T: 97.7F, Sao2: 99% RA  CBC significant for WBC: 15.9 with left shift. Lactate 2.5  s/p 1L NS bolus + Vanco 750mg IVPB once  CT head w/o co: no acute events  EKG: NSR VR@70bpm (09 Feb 2018 23:11)      PAST MEDICAL & SURGICAL HISTORY:  No pertinent past medical history  No significant past surgical history          MEDICATIONS:  MEDICATIONS  (STANDING):  Dakins Solution - 1/2 Strength 1 Application(s) Topical once  heparin  Injectable 5000 Unit(s) SubCutaneous every 8 hours  piperacillin/tazobactam IVPB. 3.375 Gram(s) IV Intermittent every 12 hours  simvastatin 20 milliGRAM(s) Oral at bedtime  vancomycin  IVPB 1000 milliGRAM(s) IV Intermittent every 24 hours      Allergies    No Known Allergies    Intolerances        FAMILY HISTORY:    Non-contributary for premature coronary disease or sudden cardiac death    SOCIAL HISTORY:    [ X] Non-smoker  [ ] Smoker  [ ] Alcohol      REVIEW OF SYSTEMS:  [ ]chest pain  [  ]shortness of breath  [  ]palpitations  [  ]syncope  [ ]near syncope [ ]upper extremity weakness   [ ] lower extremity weakness  [  ]diplopia  [  ]altered mental status   [  ]fevers  [ ]chills [ ]nausea  [ ]vomitting  [  ]dysphagia    [ ]abdominal pain  [ ]melena  [ ]BRBPR    [  ]epistaxis  [  ]rash    [ ]lower extremity edema        [X ] All others negative	  [ ] Unable to obtain    PHYSICAL EXAM:  T(C): 37.1 (02-12-18 @ 05:15), Max: 37.5 (02-11-18 @ 22:00)  HR: 80 (02-12-18 @ 05:15) (80 - 93)  BP: 155/60 (02-12-18 @ 05:15) (113/61 - 155/60)  RR: 17 (02-12-18 @ 05:15) (16 - 17)  SpO2: 95% (02-12-18 @ 05:15) (95% - 99%)  Wt(kg): --  I&O's Summary    11 Feb 2018 07:01  -  12 Feb 2018 07:00  --------------------------------------------------------  IN: 240 mL / OUT: 300 mL / NET: -60 mL          HEENT:   Normal oral mucosa,	  Lymphatic: No obvious lymphadenopathy , (+)edema weeping ulcers  Cardiovascular: Normal S1 S2, No JVD,  1/6 AMERICO murmur , Peripheral pulses palpable 2+ bilaterally  Respiratory: Lungs clear to auscultation, shallow effort 	  Gastrointestinal:  Soft, Non-tender, + BS	  Skin: No rashes, No cyanosis, warm to touch  Psychiatry:  Appropriate Mood & affect      	    ECG:  	sinus 70 BPM, LAE   RADIOLOGY:         CXR: Retrocardiac left lower lobe opacity. Prominent left hilum. A   nonemergent CT scan of the chest is recommended.      	  LABS:	 	    CARDIAC MARKERS:  CARDIAC MARKERS ( 09 Feb 2018 21:45 )  0.040 ng/mL / x     / x     / x     / x                                  10.0   8.7   )-----------( 153      ( 11 Feb 2018 06:33 )             33.5     Hb Trend:     02-11    142  |  107  |  22<H>  ----------------------------<  93  3.6   |  30  |  0.96    Ca    8.2<L>      11 Feb 2018 06:33  Phos  2.1     02-11  Mg     2.2     02-11    TPro  5.5<L>  /  Alb  2.1<L>  /  TBili  0.5  /  DBili  0.2  /  AST  43<H>  /  ALT  32  /  AlkPhos  70  02-10    Creatinine Trend: 0.96<--, 1.09<--, 1.45<--      ASSESSMENT/PLAN: 	87y Female HTN, Chol, varicose veins, stasis ulcers found with cellulitis and 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    - The incidental finding of an ASA or PFO does not require specific treatment if the patient has not had a CVA or other evidence of arterial embolization.  - No need for further cardiac w/u  - Abx per ID    I once again thank you for allowing me to participate in the care of your patient.  If you have any questions or concerns please do not hesitate to contact me.      Ponce Coreas MD, [facility], [facility]  [address] [place] [address]  PHONE:  [phone]  BEEPER : [phone]

## 2018-02-12 NOTE — PROGRESS NOTE ADULT - PROBLEM SELECTOR PLAN 3
BP stable off home medication Lopressor  - Monitor and restart as indicated BP stable off home medication Lopressor  - Aortic septic aneurysm, no acute intervention at this time as patient has no history of CVA.

## 2018-02-12 NOTE — PROGRESS NOTE ADULT - SUBJECTIVE AND OBJECTIVE BOX
PGY 1 Note discussed with supervising resident and primary attending    Patient is a 87y old  Female who presents with a chief complaint of LLE cellulitis with infected ulcers (09 Feb 2018 23:11)      INTERVAL HPI/OVERNIGHT EVENTS: offers no new complaints; current symptoms resolving    MEDICATIONS  (STANDING):  Dakins Solution - 1/2 Strength 1 Application(s) Topical once  heparin  Injectable 5000 Unit(s) SubCutaneous every 8 hours  piperacillin/tazobactam IVPB. 3.375 Gram(s) IV Intermittent every 12 hours  simvastatin 20 milliGRAM(s) Oral at bedtime  vancomycin  IVPB 1000 milliGRAM(s) IV Intermittent every 24 hours    MEDICATIONS  (PRN):  acetaminophen   Tablet 650 milliGRAM(s) Oral every 6 hours PRN For Temp greater than 38 C (100.4 F)  acetaminophen   Tablet. 650 milliGRAM(s) Oral every 8 hours PRN Mild Pain (1 - 3)      __________________________________________________  REVIEW OF SYSTEMS:    CONSTITUTIONAL: No fever,   EYES: no acute visual disturbances  NECK: No pain or stiffness  RESPIRATORY: No cough; No shortness of breath  CARDIOVASCULAR: No chest pain, no palpitations  GASTROINTESTINAL: No pain. No nausea or vomiting; No diarrhea   NEUROLOGICAL: No headache or numbness, no tremors  MUSCULOSKELETAL: No joint pain, no muscle pain  GENITOURINARY: no dysuria, no frequency, no hesitancy  PSYCHIATRY: no depression , no anxiety  ALL OTHER  ROS negative        Vital Signs Last 24 Hrs  T(C): 37.1 (12 Feb 2018 05:15), Max: 37.5 (11 Feb 2018 22:00)  T(F): 98.7 (12 Feb 2018 05:15), Max: 99.5 (11 Feb 2018 22:00)  HR: 80 (12 Feb 2018 05:15) (80 - 93)  BP: 155/60 (12 Feb 2018 05:15) (113/61 - 155/60)  BP(mean): --  RR: 17 (12 Feb 2018 05:15) (16 - 17)  SpO2: 95% (12 Feb 2018 05:15) (95% - 99%)    ________________________________________________  PHYSICAL EXAM:  GENERAL: NAD  HEENT: Normocephalic;  conjunctivae and sclerae clear; moist mucous membranes;   NECK : supple  CHEST/LUNG: Clear to auscultation bilaterally with good air entry   HEART: S1 S2  regular; no murmurs, gallops or rubs  ABDOMEN: Soft, Nontender, Nondistended; Bowel sounds present  EXTREMITIES: no cyanosis; no edema; no calf tenderness  SKIN: warm and dry; no rash  NERVOUS SYSTEM:  Awake and alert; Oriented  to place, person and time ; no new deficits    _________________________________________________  LABS:                        10.0   8.7   )-----------( 153      ( 11 Feb 2018 06:33 )             33.5     02-11    142  |  107  |  22<H>  ----------------------------<  93  3.6   |  30  |  0.96    Ca    8.2<L>      11 Feb 2018 06:33  Phos  2.1     02-11  Mg     2.2     02-11    TPro  5.5<L>  /  Alb  2.1<L>  /  TBili  0.5  /  DBili  0.2  /  AST  43<H>  /  ALT  32  /  AlkPhos  70  02-10        CAPILLARY BLOOD GLUCOSE            RADIOLOGY & ADDITIONAL TESTS:    Imaging Personally Reviewed:  YES/NO    Consultant(s) Notes Reviewed:   YES/ No    Care Discussed with Consultants :     Plan of care was discussed with patient and /or primary care giver; all questions and concerns were addressed and care was aligned with patient's wishes. PGY 1 Note discussed with supervising resident and primary attending    Patient is a 87y old  Female who presents with a chief complaint of LLE cellulitis with infected ulcers (09 Feb 2018 23:11)      INTERVAL HPI/OVERNIGHT EVENTS: offers no new complaints; current symptoms resolving    MEDICATIONS  (STANDING):  Dakins Solution - 1/2 Strength 1 Application(s) Topical once  heparin  Injectable 5000 Unit(s) SubCutaneous every 8 hours  piperacillin/tazobactam IVPB. 3.375 Gram(s) IV Intermittent every 12 hours  simvastatin 20 milliGRAM(s) Oral at bedtime  vancomycin  IVPB 1000 milliGRAM(s) IV Intermittent every 24 hours    MEDICATIONS  (PRN):  acetaminophen   Tablet 650 milliGRAM(s) Oral every 6 hours PRN For Temp greater than 38 C (100.4 F)  acetaminophen   Tablet. 650 milliGRAM(s) Oral every 8 hours PRN Mild Pain (1 - 3)      __________________________________________________  REVIEW OF SYSTEMS:    CONSTITUTIONAL: No fever,   EYES: no acute visual disturbances  NECK: No pain or stiffness  RESPIRATORY: No cough; No shortness of breath  CARDIOVASCULAR: No chest pain, no palpitations  GASTROINTESTINAL: No pain. No nausea or vomiting; No diarrhea   NEUROLOGICAL: No headache or numbness, no tremors  MUSCULOSKELETAL: No joint pain, no muscle pain  GENITOURINARY: no dysuria, no frequency, no hesitancy  PSYCHIATRY: no depression , no anxiety  ALL OTHER  ROS negative        Vital Signs Last 24 Hrs  T(C): 37.1 (12 Feb 2018 05:15), Max: 37.5 (11 Feb 2018 22:00)  T(F): 98.7 (12 Feb 2018 05:15), Max: 99.5 (11 Feb 2018 22:00)  HR: 80 (12 Feb 2018 05:15) (80 - 93)  BP: 155/60 (12 Feb 2018 05:15) (113/61 - 155/60)  BP(mean): --  RR: 17 (12 Feb 2018 05:15) (16 - 17)  SpO2: 95% (12 Feb 2018 05:15) (95% - 99%)    ________________________________________________  PHYSICAL EXAM:  GENERAL: NAD  HEENT: Normocephalic;  conjunctivae and sclerae clear; moist mucous membranes;   NECK : supple  CHEST/LUNG: Clear to auscultation bilaterally with good air entry   HEART: S1 S2  regular; no murmurs, gallops or rubs  ABDOMEN: Soft, Nontender, Nondistended; Bowel sounds present  EXTREMITIES: redness and warmth of right lower extremity, poorly groomed bilateral feet   SKIN: warm and dry; no rash  NERVOUS SYSTEM:  Awake and alert; Oriented  to place, person and time ; no new deficits    _________________________________________________  LABS:                        10.0   8.7   )-----------( 153      ( 11 Feb 2018 06:33 )             33.5     02-11    142  |  107  |  22<H>  ----------------------------<  93  3.6   |  30  |  0.96    Ca    8.2<L>      11 Feb 2018 06:33  Phos  2.1     02-11  Mg     2.2     02-11    TPro  5.5<L>  /  Alb  2.1<L>  /  TBili  0.5  /  DBili  0.2  /  AST  43<H>  /  ALT  32  /  AlkPhos  70  02-10        CAPILLARY BLOOD GLUCOSE            RADIOLOGY & ADDITIONAL TESTS:    Imaging Personally Reviewed:  YES    Consultant(s) Notes Reviewed:   YES    Care Discussed with Consultants :     Plan of care was discussed with patient and /or primary care giver; all questions and concerns were addressed and care was aligned with patient's wishes.

## 2018-02-12 NOTE — PROGRESS NOTE ADULT - PROBLEM SELECTOR PLAN 1
Patient lives alone, can not take care of self and s/p mechanical fall, found to have lower extremity cellulitis w/ infected ulcers and unable to ambulate  - PT recommends ROMI  - CT head, XR pelvic, and L tib/fib negative  ***SW consult to evaluate living situation Patient lives alone, can not take care of self and s/p mechanical fall, found to have lower extremity cellulitis w/ infected ulcers and unable to ambulate  - PT recommends ROMI  - CT head, XR pelvic, and L tib/fib negative  ***SW following

## 2018-02-12 NOTE — PROGRESS NOTE ADULT - SUBJECTIVE AND OBJECTIVE BOX
S : 87y year old Female seen at bedside for right leg cellulitis and left leg ulcerations.  Patient is NAD and AAO x3. Patient reports no acute events overnight.     Chief Complaint : Patient is a 87y old  Female who presents with a chief complaint of LLE cellulitis with infected ulcers (09 Feb 2018 23:11)      Patient admits to  (-) Fevers, (-) Chills, (-) Nausea, (-) Vomiting, (-) Shortness of Breath      PMH: No pertinent past medical history  No pertinent past medical history    PSH:No significant past surgical history  No significant past surgical history      Allergies:No Known Allergies      Labs:                          10.0   8.7   )-----------( 153      ( 11 Feb 2018 06:33 )             33.5   02-11    142  |  107  |  22<H>  ----------------------------<  93  3.6   |  30  |  0.96    Ca    8.2<L>      11 Feb 2018 06:33  Phos  2.1     02-11  Mg     2.2     02-11    TPro  5.5<L>  /  Alb  2.1<L>  /  TBili  0.5  /  DBili  0.2  /  AST  43<H>  /  ALT  32  /  AlkPhos  70  02-10    ICU Vital Signs Last 24 Hrs  T(C): 37.1 (12 Feb 2018 05:15), Max: 37.5 (11 Feb 2018 22:00)  T(F): 98.7 (12 Feb 2018 05:15), Max: 99.5 (11 Feb 2018 22:00)  HR: 80 (12 Feb 2018 05:15) (80 - 93)  BP: 155/60 (12 Feb 2018 05:15) (113/61 - 155/60)  BP(mean): --  ABP: --  ABP(mean): --  RR: 17 (12 Feb 2018 05:15) (16 - 17)  SpO2: 95% (12 Feb 2018 05:15) (95% - 99%)               O:   General: Pleasant  female NAD & AOX3.    Integument:  Skin warm, dry and supple bilateral.    Right extremity erythema extending distal to proximal leg below tibia: +erythema,  no open lesions, dirty noted, hyperkeratotic and mycotic toenails noted bilaterally   Left lower extremity medial and alteral ulcerations mid calf:  - hyperkeratotic border, wound base fibrogranular, + edema, + [place]ison-wound erythema, - purulence, - fluctuance, - tracking/tunneling, - probe to bone.   Vascular: Dorsalis Pedis and Posterior Tibial pulses 1/4.  Capillary re-fill time greater then 3 seconds digits 1-5 bilateral.    Neuro: Protective sensation diminished to the level of the digits bilateral.  MSK: Muscle strength 5/5 all major muscle groups bilateral.  Deformity:  A: BLE cellultiis and LLE ulcerations       P:   Chart reviewed and Patient evaluated  Discussed diagnosis and treatment with patient  Scrubbed down bilateral extremities with chlorohexidine scrub brush to remove dirt and debris   Cut toenails to normal length with nail nipper to normal length   Rx: santyl  Applied santyl, dakins soaked guaze to LLE  Obtained wound culture to be sent to Pathology  Ulcers are superficial and local application of santyl will be done daily   Applied wet to dry dakins soaked guaze on YANIRA with melany   X-rays ordered   Continue with IV antibiotics As Per ID  Ordered TONIE  WBAT bilaterally   Offloading to bilateral Heels in bed   Discussed importance of daily foot examinations and proper shoe gear and to importance of lower Fasting Blood Glucose levels.   Podiatry will follow while in house.  Discussed with Dr. Huntley

## 2018-02-13 LAB
ANION GAP SERPL CALC-SCNC: 5 MMOL/L — SIGNIFICANT CHANGE UP (ref 5–17)
BUN SERPL-MCNC: 25 MG/DL — HIGH (ref 7–18)
CALCIUM SERPL-MCNC: 8.3 MG/DL — LOW (ref 8.4–10.5)
CHLORIDE SERPL-SCNC: 106 MMOL/L — SIGNIFICANT CHANGE UP (ref 96–108)
CO2 SERPL-SCNC: 31 MMOL/L — SIGNIFICANT CHANGE UP (ref 22–31)
CREAT SERPL-MCNC: 0.75 MG/DL — SIGNIFICANT CHANGE UP (ref 0.5–1.3)
GLUCOSE SERPL-MCNC: 92 MG/DL — SIGNIFICANT CHANGE UP (ref 70–99)
HCT VFR BLD CALC: 33.7 % — LOW (ref 34.5–45)
HGB BLD-MCNC: 10.5 G/DL — LOW (ref 11.5–15.5)
MCHC RBC-ENTMCNC: 29.4 PG — SIGNIFICANT CHANGE UP (ref 27–34)
MCHC RBC-ENTMCNC: 31.1 GM/DL — LOW (ref 32–36)
MCV RBC AUTO: 94.5 FL — SIGNIFICANT CHANGE UP (ref 80–100)
PLATELET # BLD AUTO: 162 K/UL — SIGNIFICANT CHANGE UP (ref 150–400)
POTASSIUM SERPL-MCNC: 3.2 MMOL/L — LOW (ref 3.5–5.3)
POTASSIUM SERPL-SCNC: 3.2 MMOL/L — LOW (ref 3.5–5.3)
RBC # BLD: 3.57 M/UL — LOW (ref 3.8–5.2)
RBC # FLD: 13.1 % — SIGNIFICANT CHANGE UP (ref 10.3–14.5)
SODIUM SERPL-SCNC: 142 MMOL/L — SIGNIFICANT CHANGE UP (ref 135–145)
WBC # BLD: 6.6 K/UL — SIGNIFICANT CHANGE UP (ref 3.8–10.5)
WBC # FLD AUTO: 6.6 K/UL — SIGNIFICANT CHANGE UP (ref 3.8–10.5)

## 2018-02-13 PROCEDURE — 93925 LOWER EXTREMITY STUDY: CPT | Mod: 26

## 2018-02-13 PROCEDURE — 73610 X-RAY EXAM OF ANKLE: CPT | Mod: 26,LT

## 2018-02-13 RX ORDER — POTASSIUM CHLORIDE 20 MEQ
40 PACKET (EA) ORAL EVERY 4 HOURS
Qty: 0 | Refills: 0 | Status: DISCONTINUED | OUTPATIENT
Start: 2018-02-13 | End: 2018-02-13

## 2018-02-13 RX ORDER — METOPROLOL TARTRATE 50 MG
25 TABLET ORAL ONCE
Qty: 0 | Refills: 0 | Status: COMPLETED | OUTPATIENT
Start: 2018-02-13 | End: 2018-02-13

## 2018-02-13 RX ORDER — POTASSIUM PHOSPHATE, MONOBASIC POTASSIUM PHOSPHATE, DIBASIC 236; 224 MG/ML; MG/ML
30 INJECTION, SOLUTION INTRAVENOUS ONCE
Qty: 0 | Refills: 0 | Status: COMPLETED | OUTPATIENT
Start: 2018-02-13 | End: 2018-02-13

## 2018-02-13 RX ORDER — METOPROLOL TARTRATE 50 MG
50 TABLET ORAL
Qty: 0 | Refills: 0 | Status: DISCONTINUED | OUTPATIENT
Start: 2018-02-13 | End: 2018-02-14

## 2018-02-13 RX ADMIN — SIMVASTATIN 20 MILLIGRAM(S): 20 TABLET, FILM COATED ORAL at 21:43

## 2018-02-13 RX ADMIN — Medication 250 MILLIGRAM(S): at 05:56

## 2018-02-13 RX ADMIN — HEPARIN SODIUM 5000 UNIT(S): 5000 INJECTION INTRAVENOUS; SUBCUTANEOUS at 14:57

## 2018-02-13 RX ADMIN — Medication 25 MILLIGRAM(S): at 06:41

## 2018-02-13 RX ADMIN — Medication 650 MILLIGRAM(S): at 20:38

## 2018-02-13 RX ADMIN — Medication 50 MILLIGRAM(S): at 19:21

## 2018-02-13 RX ADMIN — Medication 650 MILLIGRAM(S): at 21:45

## 2018-02-13 RX ADMIN — HEPARIN SODIUM 5000 UNIT(S): 5000 INJECTION INTRAVENOUS; SUBCUTANEOUS at 05:57

## 2018-02-13 RX ADMIN — HEPARIN SODIUM 5000 UNIT(S): 5000 INJECTION INTRAVENOUS; SUBCUTANEOUS at 21:43

## 2018-02-13 RX ADMIN — POTASSIUM PHOSPHATE, MONOBASIC POTASSIUM PHOSPHATE, DIBASIC 85 MILLIMOLE(S): 236; 224 INJECTION, SOLUTION INTRAVENOUS at 15:04

## 2018-02-13 NOTE — CHART NOTE - NSCHARTNOTEFT_GEN_A_CORE
Paged by nurse, Patient's BP is high 174/81, Previous BP readings are also on higher side. She takes lopressor 50mg at home. BP on hold due to borderline BP on admission. Given stat dose of 25 lopressor PO. Advised nurse to recheck BP after sometime.    Endorsed to primary team to start patient's BP medication with parameters

## 2018-02-13 NOTE — PROGRESS NOTE ADULT - SUBJECTIVE AND OBJECTIVE BOX
INTERVAL HPI/OVERNIGHT EVENTS: I feel better .   Vital Signs Last 24 Hrs  T(C): 37 (13 Feb 2018 05:35), Max: 37.2 (12 Feb 2018 21:25)  T(F): 98.6 (13 Feb 2018 05:35), Max: 98.9 (12 Feb 2018 21:25)  HR: 84 (13 Feb 2018 05:35) (84 - 88)  BP: 174/81 (13 Feb 2018 05:35) (132/62 - 174/81)  BP(mean): --  RR: 18 (13 Feb 2018 05:35) (17 - 18)  SpO2: 97% (13 Feb 2018 05:35) (97% - 98%)  I&O's Summary    MEDICATIONS  (STANDING):  collagenase Ointment 1 Application(s) Topical once  Dakins Solution - 1/2 Strength 1 Application(s) Topical once  heparin  Injectable 5000 Unit(s) SubCutaneous every 8 hours  metoprolol     tartrate 50 milliGRAM(s) Oral two times a day  potassium chloride    Tablet ER 40 milliEquivalent(s) Oral every 4 hours  simvastatin 20 milliGRAM(s) Oral at bedtime  vancomycin  IVPB 1000 milliGRAM(s) IV Intermittent every 24 hours    MEDICATIONS  (PRN):  acetaminophen   Tablet 650 milliGRAM(s) Oral every 6 hours PRN For Temp greater than 38 C (100.4 F)  acetaminophen   Tablet. 650 milliGRAM(s) Oral every 8 hours PRN Mild Pain (1 - 3)    LABS:                        10.5   6.6   )-----------( 162      ( 13 Feb 2018 07:52 )             33.7     02-13    142  |  106  |  25<H>  ----------------------------<  92  3.2<L>   |  31  |  0.75    Ca    8.3<L>      13 Feb 2018 07:52          CAPILLARY BLOOD GLUCOSE              REVIEW OF SYSTEMS:  CONSTITUTIONAL: No fever, weight loss, or fatigue  EYES: No eye pain, visual disturbances, or discharge  ENMT:  No difficulty hearing, tinnitus, vertigo; No sinus or throat pain  RESPIRATORY: No cough, wheezing, chills or hemoptysis; No shortness of breath  CARDIOVASCULAR: No chest pain, palpitations, dizziness, or leg swelling  GASTROINTESTINAL: No abdominal or epigastric pain. No nausea, vomiting, or hematemesis; No diarrhea or constipation. No melena or hematochezia.  GENITOURINARY: No dysuria, frequency, hematuria, or incontinence      Consultant(s) Notes Reviewed:  [x ] YES  [ ] NO    PHYSICAL EXAM:  GENERAL: NAD, well-groomed, well-developed, not in any distress ,  HEAD:  Atraumatic, Normocephalic  EYES: EOMI, PERRLA, conjunctiva and sclera clear  ENMT: No tonsillar erythema, exudates, or enlargement; Moist mucous membranes, Good dentition, No lesions  NECK: Supple, No JVD, Normal thyroid  NERVOUS SYSTEM:  Alert & Oriented X3, No focal deficit   CHEST/LUNG: Good air entry bilateral with no  rales, rhonchi, wheezing, or rubs  HEART: Regular rate and rhythm; No murmurs, rubs, or gallops  ABDOMEN: Soft, Nontender, Nondistended; Bowel sounds present  EXTREMITIES:  legs dressed , No clubbing, cyanosis, or edema      Care Discussed with Consultants/Other Providers [ x] YES  [ ] NO

## 2018-02-13 NOTE — PROGRESS NOTE ADULT - PROBLEM SELECTOR PLAN 2
Left leg cellulitis with infected ulcers and blisters.  -leucocytosis resolved, afebrile,    ESR elevated 30s; fever 38.4 C  - Continue with Vancomycin 1g IVPB q24 hrs -dc zosyn  ***F/u BCx, TONIE  ID Dr. De Luna; Vancomycin   trough today 9  Podiatry Dr. Jones following Left leg cellulitis with infected ulcers and blisters.  -leucocytosis resolved, afebrile,    ESR elevated 30s; afebrile  - Continue with Vancomycin 1g IVPB q24 hrs  -f/u vanc trough in am  ***F/u BCx -ve so far, TONIE  ID Dr. De Luna; Vancomycin   Podiatry Dr. Jones following

## 2018-02-13 NOTE — PROGRESS NOTE ADULT - ASSESSMENT
87F, from home (lives alone), independent at baseline, PMHx HTN, HLD. OA, varicose veins, who presented to ED s/p mechanical fall, as per patient she tripped with the bathroom rug, legs "gave out" and could not stand up due to left leg pain, thereafter, patient screamed for help and daughter came to assist her. Patient denies LOC, chest pain, palpitations, vertigo during fall event.  As per daughter, patient has been limping from left leg for a couple of weeks. They were not "aware" of patients leg infection prior to fall. Patient refers wearing a sock on left foot for "1 week" "because she noticed infection and thought it'll get better with sock". Patient was asked if she washes her legs when showering, to which she was not able to respond clearly. She refers that daughter helps her. Patient denies fever/chills, diarrhea. Refers pain 5/10 left leg.      Problem/Plan - 1:  ·  Problem: Syncope ; Patient was found on floor . Work up in progress.      Problem/Plan - 2:  ·  Problem: Cellulitis.  Plan: Left leg cellulitis with infected ulcers and blisters.  WBC: 15.9 w/ left shift, lactate. 2.4, afebrile  s/p 1L NS bolus + Vancomycin 750 mg IVPB once  Continue with Vancomycin 1g IVPB q24 hrs + Zosyn for now  f/up BCx  f/up left tibial XR: gas not appreciated, however f/up results  f/up TONIE  f/up ESR, CRP  Podiatry consulted  ID consulted: Dr De Luna.      Problem/Plan - 3:  ·  Problem: Hypertension.  Plan: Lopressor 50 mg BID at home  Holding antihypertensives as blood pressure is borderline low  Monitor blood pressure and start medications if clinically indicated.      Problem/Plan - 4:  ·  Problem: Failure to thrive in adult.  Plan: Patient lives alone, can not take care of self  s/p mechanical fall, found to have lower extremity cellulitis w/ infected ulcers, unable to ambulate  SW consult to evaluate living situation  CT head w/o co: no acute events  f/up pelvic XR  PT evaluation.    Problem/Plan - 5:  ·  Problem: Need for prophylactic measure.  Plan:   HSQ 5KU q8hrs for DVT chemoppx.
87 F, from home (lives alone), independent at baseline, PMHx HTN, HLD. OA, Varicose veins, who presented to ED s/p mechanical fall - admitted for FTT and left leg cellulitis with infected ulcers.
87 F, from home (lives alone), independent at baseline, PMHx HTN, HLD. OA, Varicose veins, who presented to ED s/p mechanical fall - admitted for FTT and left leg cellulitis with infected ulcers.     Problem/Plan - 1:  ·  Problem: Failure to thrive in adult.  Plan: Eating better now . Going to rehab .      Problem/Plan - 2:  ·  Problem: Cellulitis.  Plan: Left leg cellulitis with infected ulcers and blisters.  - Improving. Podiatry and ID help appreciated. Wound care consulted.    Problem/Plan - 3:  ·  Problem: Hypertension.  Plan: BP running little high so will adjust BP meds .      Problem/Plan - 4:  ·  Problem: Normocytic Anemia .  Plan: Work up ordered. HH better and needs rpt CBC with further work up as outpt . Likely Nutritional as has not been eating well. No evidence of bleeding.      Problem/Plan - 5:  ·  Problem: Hypokalemia and Hypophosphatemia .  Plan: Repleting.      Problem/Plan - 6:  ·  Problem: Need for prophylactic measure.  Plan: IMPROVE VTE Score: 4, HSQ 5K q8hrs for DVT PPx.     Disposition : DC planning to REHAB .

## 2018-02-13 NOTE — ADVANCED PRACTICE NURSE CONSULT - ASSESSMENT
This is a 87yr old female patient admitted for Cellulitis, presenting with a DTI to the Bilateral Coccyx (4cm x 13cm) without drainage

## 2018-02-13 NOTE — PROGRESS NOTE ADULT - ATTENDING COMMENTS
Patient seen and examined, agree with above assessment and plan as transcribed above.    - No need for further cardiac w/u    Ponce Coreas MD, FACC  [facility], [facility]  [address] [place] [address]  PHONE:  [phone]  BEEPER : [phone]
seen and examined . Agree with A/P . Will consult wound care also.
Seen and examined with daughter in room .  feel better . Dc planning.

## 2018-02-13 NOTE — PROGRESS NOTE ADULT - SUBJECTIVE AND OBJECTIVE BOX
PGY 1 Note discussed with supervising resident and primary attending    Patient is a 87y old  Female who presents with a chief complaint of LLE cellulitis with infected ulcers (09 Feb 2018 23:11)      INTERVAL HPI/OVERNIGHT EVENTS: offers no new complaints; current symptoms resolving    MEDICATIONS  (STANDING):  collagenase Ointment 1 Application(s) Topical once  Dakins Solution - 1/2 Strength 1 Application(s) Topical once  heparin  Injectable 5000 Unit(s) SubCutaneous every 8 hours  metoprolol     tartrate 50 milliGRAM(s) Oral two times a day  potassium chloride    Tablet ER 40 milliEquivalent(s) Oral every 4 hours  simvastatin 20 milliGRAM(s) Oral at bedtime  vancomycin  IVPB 1000 milliGRAM(s) IV Intermittent every 24 hours    MEDICATIONS  (PRN):  acetaminophen   Tablet 650 milliGRAM(s) Oral every 6 hours PRN For Temp greater than 38 C (100.4 F)  acetaminophen   Tablet. 650 milliGRAM(s) Oral every 8 hours PRN Mild Pain (1 - 3)      __________________________________________________  REVIEW OF SYSTEMS:    CONSTITUTIONAL: No fever,   EYES: no acute visual disturbances  NECK: No pain or stiffness  RESPIRATORY: No cough; No shortness of breath  CARDIOVASCULAR: No chest pain, no palpitations  GASTROINTESTINAL: No pain. No nausea or vomiting; No diarrhea   NEUROLOGICAL: No headache or numbness, no tremors  MUSCULOSKELETAL: No joint pain, no muscle pain  GENITOURINARY: no dysuria, no frequency, no hesitancy  PSYCHIATRY: no depression , no anxiety  ALL OTHER  ROS negative        Vital Signs Last 24 Hrs  T(C): 37 (13 Feb 2018 05:35), Max: 37.2 (12 Feb 2018 21:25)  T(F): 98.6 (13 Feb 2018 05:35), Max: 98.9 (12 Feb 2018 21:25)  HR: 84 (13 Feb 2018 05:35) (84 - 88)  BP: 174/81 (13 Feb 2018 05:35) (132/62 - 174/81)  BP(mean): --  RR: 18 (13 Feb 2018 05:35) (17 - 18)  SpO2: 97% (13 Feb 2018 05:35) (97% - 98%)    ________________________________________________  PHYSICAL EXAM:  GENERAL: NAD  HEENT: Normocephalic;  conjunctivae and sclerae clear; moist mucous membranes;   NECK : supple  CHEST/LUNG: Clear to auscultation bilaterally with good air entry   HEART: S1 S2  regular; no murmurs, gallops or rubs  ABDOMEN: Soft, Nontender, Nondistended; Bowel sounds present  EXTREMITIES: no cyanosis; no edema; no calf tenderness  SKIN: warm and dry; no rash  NERVOUS SYSTEM:  Awake and alert; Oriented  to place, person and time ; no new deficits    _________________________________________________  LABS:                        10.5   6.6   )-----------( 162      ( 13 Feb 2018 07:52 )             33.7     02-13    142  |  106  |  25<H>  ----------------------------<  92  3.2<L>   |  31  |  0.75    Ca    8.3<L>      13 Feb 2018 07:52          CAPILLARY BLOOD GLUCOSE            RADIOLOGY & ADDITIONAL TESTS:    Imaging Personally Reviewed:  YES/NO    Consultant(s) Notes Reviewed:   YES/ No    Care Discussed with Consultants :     Plan of care was discussed with patient and /or primary care giver; all questions and concerns were addressed and care was aligned with patient's wishes. PGY 1 Note discussed with supervising resident and primary attending    Patient is a 87y old  Female who presents with a chief complaint of LLE cellulitis with infected ulcers (09 Feb 2018 23:11)      INTERVAL HPI/OVERNIGHT EVENTS: offers no new complaints; current symptoms resolving    MEDICATIONS  (STANDING):  collagenase Ointment 1 Application(s) Topical once  Dakins Solution - 1/2 Strength 1 Application(s) Topical once  heparin  Injectable 5000 Unit(s) SubCutaneous every 8 hours  metoprolol     tartrate 50 milliGRAM(s) Oral two times a day  potassium chloride    Tablet ER 40 milliEquivalent(s) Oral every 4 hours  simvastatin 20 milliGRAM(s) Oral at bedtime  vancomycin  IVPB 1000 milliGRAM(s) IV Intermittent every 24 hours    MEDICATIONS  (PRN):  acetaminophen   Tablet 650 milliGRAM(s) Oral every 6 hours PRN For Temp greater than 38 C (100.4 F)  acetaminophen   Tablet. 650 milliGRAM(s) Oral every 8 hours PRN Mild Pain (1 - 3)      __________________________________________________  REVIEW OF SYSTEMS:    CONSTITUTIONAL: No fever,   EYES: no acute visual disturbances  NECK: No pain or stiffness  RESPIRATORY: No cough; No shortness of breath  CARDIOVASCULAR: No chest pain, no palpitations  GASTROINTESTINAL: No pain. No nausea or vomiting; No diarrhea   NEUROLOGICAL: No headache or numbness, no tremors  MUSCULOSKELETAL: No joint pain, no muscle pain  GENITOURINARY: no dysuria, no frequency, no hesitancy  PSYCHIATRY: no depression , no anxiety  ALL OTHER  ROS negative        Vital Signs Last 24 Hrs  T(C): 37 (13 Feb 2018 05:35), Max: 37.2 (12 Feb 2018 21:25)  T(F): 98.6 (13 Feb 2018 05:35), Max: 98.9 (12 Feb 2018 21:25)  HR: 84 (13 Feb 2018 05:35) (84 - 88)  BP: 174/81 (13 Feb 2018 05:35) (132/62 - 174/81)  BP(mean): --  RR: 18 (13 Feb 2018 05:35) (17 - 18)  SpO2: 97% (13 Feb 2018 05:35) (97% - 98%)    ________________________________________________  PHYSICAL EXAM:  GENERAL: NAD  HEENT: Normocephalic;  conjunctivae and sclerae clear; moist mucous membranes;   NECK : supple  CHEST/LUNG: Clear to auscultation bilaterally with good air entry   HEART: S1 S2  regular; no murmurs, gallops or rubs  ABDOMEN: Soft, Nontender, Nondistended; Bowel sounds present  EXTREMITIES: redness and warmth of right lower extremity, poorly groomed bilateral feet   SKIN: warm and dry; no rash  NERVOUS SYSTEM:  Awake and alert; Oriented  to place, person and time ; no new deficits        _________________________________________________  LABS:                        10.5   6.6   )-----------( 162      ( 13 Feb 2018 07:52 )             33.7     02-13    142  |  106  |  25<H>  ----------------------------<  92  3.2<L>   |  31  |  0.75    Ca    8.3<L>      13 Feb 2018 07:52          CAPILLARY BLOOD GLUCOSE            RADIOLOGY & ADDITIONAL TESTS:    Imaging Personally Reviewed:  YES    Consultant(s) Notes Reviewed:   YES    Care Discussed with Consultants :     Plan of care was discussed with patient and /or primary care giver; all questions and concerns were addressed and care was aligned with patient's wishes.

## 2018-02-13 NOTE — ADVANCED PRACTICE NURSE CONSULT - RECOMMEDATIONS
-Clean the Coccyx wound with normal saline and apply skin prep to the surrounding skin  -Apply a Foam dressing Q 72hrs PRN  -Strict offloading to the Coccyx area Q 2hrs using wedges or pillows  -Elevate/float the patients heels using heel protectors

## 2018-02-13 NOTE — PROGRESS NOTE ADULT - PROBLEM SELECTOR PLAN 4
IMPROVE VTE Individual Risk Assessment          RISK                                                          Points  [  ] Previous VTE                                                3  [  ] Thrombophilia                                             2  [ x ] Lower limb paralysis                                   2        (unable to hold up >15 seconds)    [  ] Current Cancer                                             2         (within 6 months)  [ x ] Immobilization > 24 hrs                              1  [  ] ICU/CCU stay > 24 hours                             1  [ x ] Age > 60                                                         1    IMPROVE VTE Score: 4, HSQ 5K q8hrs for DVT PPx

## 2018-02-13 NOTE — PROGRESS NOTE ADULT - PROBLEM SELECTOR PLAN 1
Patient lives alone, can not take care of self and s/p mechanical fall, found to have lower extremity cellulitis w/ infected ulcers and unable to ambulate  - PT recommends ROMI  - CT head, XR pelvic, and L tib/fib negative  ***SW following

## 2018-02-13 NOTE — PROGRESS NOTE ADULT - PROBLEM SELECTOR PLAN 3
BP stable off home medication Lopressor  - Aortic septic aneurysm, no acute intervention at this time as patient has no history of CVA. started patient on lopressor 25 BID  - Aortic septic aneurysm, no acute intervention at this time as patient has no history of CVA.

## 2018-02-13 NOTE — PROGRESS NOTE ADULT - SUBJECTIVE AND OBJECTIVE BOX
Subjective:  pt seen and examined, no complaints on exam.   no chest pain or SOB on exam    acetaminophen   Tablet 650 milliGRAM(s) Oral every 6 hours PRN  acetaminophen   Tablet. 650 milliGRAM(s) Oral every 8 hours PRN  collagenase Ointment 1 Application(s) Topical once  Dakins Solution - 1/2 Strength 1 Application(s) Topical once  heparin  Injectable 5000 Unit(s) SubCutaneous every 8 hours  simvastatin 20 milliGRAM(s) Oral at bedtime  vancomycin  IVPB 1000 milliGRAM(s) IV Intermittent every 24 hours                            10.0   8.7   )-----------( 153      ( 11 Feb 2018 06:33 )             33.5       Hemoglobin: 10.0 g/dL (02-11 @ 06:33)  Hemoglobin: 9.6 g/dL (02-10 @ 06:48)  Hemoglobin: 11.6 g/dL (02-09 @ 21:45)      02-11    142  |  107  |  22<H>  ----------------------------<  93  3.6   |  30  |  0.96    Ca    8.2<L>      11 Feb 2018 06:33  Phos  2.1     02-11  Mg     2.2     02-11      Creatinine Trend: 0.96<--, 1.09<--, 1.45<--    COAGS:           T(C): 37.2 (02-12-18 @ 21:25), Max: 37.2 (02-12-18 @ 21:25)  HR: 86 (02-12-18 @ 21:25) (86 - 88)  BP: 161/70 (02-12-18 @ 21:25) (132/62 - 161/70)  RR: 18 (02-12-18 @ 21:25) (17 - 18)  SpO2: 97% (02-12-18 @ 21:25) (97% - 98%)  Wt(kg): --    I&O's Summary    11 Feb 2018 07:01  -  12 Feb 2018 07:00  --------------------------------------------------------  IN: 240 mL / OUT: 300 mL / NET: -60 mL    HEENT:   Normal oral mucosa, PERRL, EOMI	  Lymphatic: No lymphadenopathy ,++ edema / ulcers   Cardiovascular: Normal S1 S2, No JVD, No murmurs , Peripheral pulses palpable 2+ bilaterally  Respiratory: Lungs clear to auscultation, normal effort 	  Gastrointestinal:  Soft, Non-tender, + BS	    TELEMETRY: 	NSR      DIAGNOSTIC TESTING:  [ ] Echocardiogram: < from: Transthoracic Echocardiogram (02.11.18 @ 08:25) >  CONCLUSIONS:  1. Mitral annular calcification.  2. Normal trileaflet aortic valve.  3. Aortic Root: 3.0 cm.  4. Mild left atrial enlargement.  5. Mild concentric left ventricular hypertrophy.  6. Normal Left Ventricular Systolic Function,  (EF = 55 to  60%)  7. Grade IV diastolic dysfunction.  8. Normal right atrium.Intra-atrial septal aneurysm is  present.  9. Normal right ventricular size and function.  10. RV systolic pressure is 37 mm Hg.  11. There is mild tricuspid regurgitation.  12. There is mild pulmonic regurgitation.  13. Normal pericardium with no pericardial effusion.    < end of copied text >    [ ]  Catheterization:  [ ] Stress Test:    OTHER: 	        ASSESSMENT/PLAN: 	87y Female  HTN, Chol, varicose veins, stasis ulcers found with cellulitis and 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    Wound care per medicine   ABX   The incidental finding of an ASD or PFO does not require specific treatment if the patient has not had a CVA or other evidence of arterial embolization.  ** No need for further cardiac w/u   GI / DVT prophylaxis.   keep K>4, mag >2.0   D/W Dr Coreas Subjective:  pt seen and examined, no complaints on exam.   no chest pain or SOB on exam    acetaminophen   Tablet 650 milliGRAM(s) Oral every 6 hours PRN  acetaminophen   Tablet. 650 milliGRAM(s) Oral every 8 hours PRN  collagenase Ointment 1 Application(s) Topical once  Dakins Solution - 1/2 Strength 1 Application(s) Topical once  heparin  Injectable 5000 Unit(s) SubCutaneous every 8 hours  simvastatin 20 milliGRAM(s) Oral at bedtime  vancomycin  IVPB 1000 milliGRAM(s) IV Intermittent every 24 hours                            10.0   8.7   )-----------( 153      ( 11 Feb 2018 06:33 )             33.5       Hemoglobin: 10.0 g/dL (02-11 @ 06:33)  Hemoglobin: 9.6 g/dL (02-10 @ 06:48)  Hemoglobin: 11.6 g/dL (02-09 @ 21:45)      02-11    142  |  107  |  22<H>  ----------------------------<  93  3.6   |  30  |  0.96    Ca    8.2<L>      11 Feb 2018 06:33  Phos  2.1     02-11  Mg     2.2     02-11      Creatinine Trend: 0.96<--, 1.09<--, 1.45<--    COAGS:           T(C): 37.2 (02-12-18 @ 21:25), Max: 37.2 (02-12-18 @ 21:25)  HR: 86 (02-12-18 @ 21:25) (86 - 88)  BP: 161/70 (02-12-18 @ 21:25) (132/62 - 161/70)  RR: 18 (02-12-18 @ 21:25) (17 - 18)  SpO2: 97% (02-12-18 @ 21:25) (97% - 98%)  Wt(kg): --    I&O's Summary    11 Feb 2018 07:01  -  12 Feb 2018 07:00  --------------------------------------------------------  IN: 240 mL / OUT: 300 mL / NET: -60 mL    HEENT:   Normal oral mucosa, PERRL, EOMI	  Lymphatic: No lymphadenopathy ,++ stasis / ulcers   Cardiovascular: Normal S1 S2, No JVD, No murmurs , Peripheral pulses palpable 2+ bilaterally  Respiratory: Lungs clear to auscultation, normal effort 	  Gastrointestinal:  Soft, Non-tender, + BS	        DIAGNOSTIC TESTING:  [ ] Echocardiogram: < from: Transthoracic Echocardiogram (02.11.18 @ 08:25) >  CONCLUSIONS:  1. Mitral annular calcification.  2. Normal trileaflet aortic valve.  3. Aortic Root: 3.0 cm.  4. Mild left atrial enlargement.  5. Mild concentric left ventricular hypertrophy.  6. Normal Left Ventricular Systolic Function,  (EF = 55 to  60%)  7. Grade IV diastolic dysfunction.  8. Normal right atrium.Intra-atrial septal aneurysm is  present.  9. Normal right ventricular size and function.  10. RV systolic pressure is 37 mm Hg.  11. There is mild tricuspid regurgitation.  12. There is mild pulmonic regurgitation.  13. Normal pericardium with no pericardial effusion.    < end of copied text >    [ ]  Catheterization:  [ ] Stress Test:    OTHER: 	        ASSESSMENT/PLAN: 	87y Female  HTN, Chol, varicose veins, stasis ulcers found with cellulitis and 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    Wound care per medicine   ABX   The incidental finding of an ASD or PFO does not require specific treatment if the patient has not had a CVA or other evidence of arterial embolization.  ** No need for further cardiac w/u   GI / DVT prophylaxis.   keep K>4, mag >2.0   D/W Dr Coreas

## 2018-02-14 ENCOUNTER — TRANSCRIPTION ENCOUNTER (OUTPATIENT)
Age: 83
End: 2018-02-14

## 2018-02-14 VITALS
DIASTOLIC BLOOD PRESSURE: 57 MMHG | HEART RATE: 88 BPM | TEMPERATURE: 98 F | OXYGEN SATURATION: 97 % | SYSTOLIC BLOOD PRESSURE: 110 MMHG | RESPIRATION RATE: 16 BRPM

## 2018-02-14 LAB
ANION GAP SERPL CALC-SCNC: 6 MMOL/L — SIGNIFICANT CHANGE UP (ref 5–17)
BUN SERPL-MCNC: 30 MG/DL — HIGH (ref 7–18)
CALCIUM SERPL-MCNC: 8.1 MG/DL — LOW (ref 8.4–10.5)
CHLORIDE SERPL-SCNC: 107 MMOL/L — SIGNIFICANT CHANGE UP (ref 96–108)
CO2 SERPL-SCNC: 29 MMOL/L — SIGNIFICANT CHANGE UP (ref 22–31)
CREAT SERPL-MCNC: 0.85 MG/DL — SIGNIFICANT CHANGE UP (ref 0.5–1.3)
FERRITIN SERPL-MCNC: 533 NG/ML — HIGH (ref 15–150)
FOLATE SERPL-MCNC: 13.3 NG/ML — SIGNIFICANT CHANGE UP (ref 4.8–24.2)
GLUCOSE SERPL-MCNC: 77 MG/DL — SIGNIFICANT CHANGE UP (ref 70–99)
HAPTOGLOB SERPL-MCNC: 205 MG/DL — HIGH (ref 34–200)
HCT VFR BLD CALC: 33.4 % — LOW (ref 34.5–45)
HGB BLD-MCNC: 10.1 G/DL — LOW (ref 11.5–15.5)
IRON SATN MFR SERPL: 30 % — SIGNIFICANT CHANGE UP (ref 15–50)
IRON SATN MFR SERPL: 62 UG/DL — SIGNIFICANT CHANGE UP (ref 40–150)
MCHC RBC-ENTMCNC: 28.2 PG — SIGNIFICANT CHANGE UP (ref 27–34)
MCHC RBC-ENTMCNC: 30.2 GM/DL — LOW (ref 32–36)
MCV RBC AUTO: 93.5 FL — SIGNIFICANT CHANGE UP (ref 80–100)
PLATELET # BLD AUTO: 165 K/UL — SIGNIFICANT CHANGE UP (ref 150–400)
POTASSIUM SERPL-MCNC: 3.5 MMOL/L — SIGNIFICANT CHANGE UP (ref 3.5–5.3)
POTASSIUM SERPL-SCNC: 3.5 MMOL/L — SIGNIFICANT CHANGE UP (ref 3.5–5.3)
RBC # BLD: 3.58 M/UL — LOW (ref 3.8–5.2)
RBC # FLD: 13.2 % — SIGNIFICANT CHANGE UP (ref 10.3–14.5)
SODIUM SERPL-SCNC: 142 MMOL/L — SIGNIFICANT CHANGE UP (ref 135–145)
TIBC SERPL-MCNC: 209 UG/DL — LOW (ref 250–450)
UIBC SERPL-MCNC: 147 UG/DL — SIGNIFICANT CHANGE UP (ref 110–370)
VIT B12 SERPL-MCNC: 853 PG/ML — SIGNIFICANT CHANGE UP (ref 232–1245)
WBC # BLD: 6.9 K/UL — SIGNIFICANT CHANGE UP (ref 3.8–10.5)
WBC # FLD AUTO: 6.9 K/UL — SIGNIFICANT CHANGE UP (ref 3.8–10.5)

## 2018-02-14 PROCEDURE — 80202 ASSAY OF VANCOMYCIN: CPT

## 2018-02-14 PROCEDURE — 85610 PROTHROMBIN TIME: CPT

## 2018-02-14 PROCEDURE — 80076 HEPATIC FUNCTION PANEL: CPT

## 2018-02-14 PROCEDURE — 85730 THROMBOPLASTIN TIME PARTIAL: CPT

## 2018-02-14 PROCEDURE — 81001 URINALYSIS AUTO W/SCOPE: CPT

## 2018-02-14 PROCEDURE — 84484 ASSAY OF TROPONIN QUANT: CPT

## 2018-02-14 PROCEDURE — 80061 LIPID PANEL: CPT

## 2018-02-14 PROCEDURE — 85027 COMPLETE CBC AUTOMATED: CPT

## 2018-02-14 PROCEDURE — 82803 BLOOD GASES ANY COMBINATION: CPT

## 2018-02-14 PROCEDURE — 83010 ASSAY OF HAPTOGLOBIN QUANT: CPT

## 2018-02-14 PROCEDURE — 97530 THERAPEUTIC ACTIVITIES: CPT

## 2018-02-14 PROCEDURE — 80048 BASIC METABOLIC PNL TOTAL CA: CPT

## 2018-02-14 PROCEDURE — 82728 ASSAY OF FERRITIN: CPT

## 2018-02-14 PROCEDURE — 97110 THERAPEUTIC EXERCISES: CPT

## 2018-02-14 PROCEDURE — 73610 X-RAY EXAM OF ANKLE: CPT

## 2018-02-14 PROCEDURE — 87070 CULTURE OTHR SPECIMN AEROBIC: CPT

## 2018-02-14 PROCEDURE — 83550 IRON BINDING TEST: CPT

## 2018-02-14 PROCEDURE — 85652 RBC SED RATE AUTOMATED: CPT

## 2018-02-14 PROCEDURE — 83605 ASSAY OF LACTIC ACID: CPT

## 2018-02-14 PROCEDURE — 80053 COMPREHEN METABOLIC PANEL: CPT

## 2018-02-14 PROCEDURE — 82607 VITAMIN B-12: CPT

## 2018-02-14 PROCEDURE — 72170 X-RAY EXAM OF PELVIS: CPT

## 2018-02-14 PROCEDURE — 97161 PT EVAL LOW COMPLEX 20 MIN: CPT

## 2018-02-14 PROCEDURE — 73590 X-RAY EXAM OF LOWER LEG: CPT

## 2018-02-14 PROCEDURE — 82746 ASSAY OF FOLIC ACID SERUM: CPT

## 2018-02-14 PROCEDURE — 93925 LOWER EXTREMITY STUDY: CPT

## 2018-02-14 PROCEDURE — 97116 GAIT TRAINING THERAPY: CPT

## 2018-02-14 PROCEDURE — 83690 ASSAY OF LIPASE: CPT

## 2018-02-14 PROCEDURE — 71045 X-RAY EXAM CHEST 1 VIEW: CPT

## 2018-02-14 PROCEDURE — 70450 CT HEAD/BRAIN W/O DYE: CPT

## 2018-02-14 PROCEDURE — 86140 C-REACTIVE PROTEIN: CPT

## 2018-02-14 PROCEDURE — 99285 EMERGENCY DEPT VISIT HI MDM: CPT | Mod: 25

## 2018-02-14 PROCEDURE — 84443 ASSAY THYROID STIM HORMONE: CPT

## 2018-02-14 PROCEDURE — 83036 HEMOGLOBIN GLYCOSYLATED A1C: CPT

## 2018-02-14 PROCEDURE — 84100 ASSAY OF PHOSPHORUS: CPT

## 2018-02-14 PROCEDURE — 93306 TTE W/DOPPLER COMPLETE: CPT

## 2018-02-14 PROCEDURE — 87040 BLOOD CULTURE FOR BACTERIA: CPT

## 2018-02-14 PROCEDURE — 83735 ASSAY OF MAGNESIUM: CPT

## 2018-02-14 RX ORDER — COLLAGENASE CLOSTRIDIUM HIST. 250 UNIT/G
1 OINTMENT (GRAM) TOPICAL
Qty: 0 | Refills: 0 | COMMUNITY
Start: 2018-02-14

## 2018-02-14 RX ORDER — ACETAMINOPHEN 500 MG
2 TABLET ORAL
Qty: 0 | Refills: 0 | COMMUNITY
Start: 2018-02-14

## 2018-02-14 RX ORDER — SODIUM HYPOCHLORITE 0.125 %
1 SOLUTION, NON-ORAL MISCELLANEOUS
Qty: 0 | Refills: 0 | COMMUNITY
Start: 2018-02-14

## 2018-02-14 RX ADMIN — HEPARIN SODIUM 5000 UNIT(S): 5000 INJECTION INTRAVENOUS; SUBCUTANEOUS at 13:22

## 2018-02-14 RX ADMIN — Medication 650 MILLIGRAM(S): at 11:26

## 2018-02-14 RX ADMIN — Medication 50 MILLIGRAM(S): at 05:49

## 2018-02-14 RX ADMIN — Medication 250 MILLIGRAM(S): at 05:49

## 2018-02-14 RX ADMIN — HEPARIN SODIUM 5000 UNIT(S): 5000 INJECTION INTRAVENOUS; SUBCUTANEOUS at 05:49

## 2018-02-14 RX ADMIN — Medication 650 MILLIGRAM(S): at 09:53

## 2018-02-14 NOTE — DISCHARGE NOTE ADULT - MEDICATION SUMMARY - MEDICATIONS TO TAKE
I will START or STAY ON the medications listed below when I get home from the hospital:    acetaminophen 325 mg oral tablet  -- 2 tab(s) by mouth every 8 hours, As needed, Mild Pain (1 - 3)  -- Indication: For pain    acetaminophen 325 mg oral tablet  -- 2 tab(s) by mouth every 6 hours, As needed, For Temp greater than 38 C (100.4 F)  -- Indication: For pain    Zocor 20 mg oral tablet  -- 1 tab(s) by mouth once a day (at bedtime)  -- Indication: For Hyperlipidemia    doxycycline hyclate 100 mg oral capsule  -- 1 cap(s) by mouth every 12 hours   -- Avoid prolonged or excessive exposure to direct and/or artificial sunlight while taking this medication.  Do not take this drug if you are pregnant.  Finish all this medication unless otherwise directed by prescriber.  Medication should be taken with plenty of water.    -- Indication: For Cellulitis    sodium hypochlorite 0.25% topical solution  -- 1 application on skin once  -- Indication: For wound care    Lopressor 50 mg oral tablet  -- 1 tab(s) by mouth 2 times a day  -- Indication: For Hypertension    collagenase 250 units/g topical ointment  -- 1 application on skin once  -- Indication: For wound care

## 2018-02-14 NOTE — PROGRESS NOTE ADULT - SUBJECTIVE AND OBJECTIVE BOX
Subjective:  pt seen and examined, no complaints on exam.     acetaminophen   Tablet 650 milliGRAM(s) Oral every 6 hours PRN  acetaminophen   Tablet. 650 milliGRAM(s) Oral every 8 hours PRN  collagenase Ointment 1 Application(s) Topical once  Dakins Solution - 1/2 Strength 1 Application(s) Topical once  heparin  Injectable 5000 Unit(s) SubCutaneous every 8 hours  metoprolol     tartrate 50 milliGRAM(s) Oral two times a day  simvastatin 20 milliGRAM(s) Oral at bedtime  vancomycin  IVPB 1000 milliGRAM(s) IV Intermittent every 24 hours                            10.5   6.6   )-----------( 162      ( 13 Feb 2018 07:52 )             33.7       Hemoglobin: 10.5 g/dL (02-13 @ 07:52)  Hemoglobin: 10.0 g/dL (02-11 @ 06:33)  Hemoglobin: 9.6 g/dL (02-10 @ 06:48)  Hemoglobin: 11.6 g/dL (02-09 @ 21:45)      02-13    142  |  106  |  25<H>  ----------------------------<  92  3.2<L>   |  31  |  0.75    Ca    8.3<L>      13 Feb 2018 07:52      Creatinine Trend: 0.75<--, 0.96<--, 1.09<--, 1.45<--    COAGS:           T(C): 37.1 (02-14-18 @ 05:01), Max: 37.1 (02-14-18 @ 05:01)  HR: 90 (02-14-18 @ 05:01) (84 - 97)  BP: 168/78 (02-14-18 @ 05:01) (112/83 - 174/81)  RR: 17 (02-14-18 @ 05:01) (17 - 18)  SpO2: 97% (02-14-18 @ 05:01) (97% - 98%)  Wt(kg): --    I&O's Summary    HEENT:   Normal oral mucosa, PERRL, EOMI	  Lymphatic: No lymphadenopathy ,++ stasis / ulcers   Cardiovascular: Normal S1 S2, No JVD, No murmurs , Peripheral pulses palpable 2+ bilaterally  Respiratory: Lungs clear to auscultation, normal effort 	  Gastrointestinal:  Soft, Non-tender, + BS	        DIAGNOSTIC TESTING:  [ ] Echocardiogram: < from: Transthoracic Echocardiogram (02.11.18 @ 08:25) >  CONCLUSIONS:  1. Mitral annular calcification.  2. Normal trileaflet aortic valve.  3. Aortic Root: 3.0 cm.  4. Mild left atrial enlargement.  5. Mild concentric left ventricular hypertrophy.  6. Normal Left Ventricular Systolic Function,  (EF = 55 to  60%)  7. Grade IV diastolic dysfunction.  8. Normal right atrium.Intra-atrial septal aneurysm is  present.  9. Normal right ventricular size and function.  10. RV systolic pressure is 37 mm Hg.  11. There is mild tricuspid regurgitation.  12. There is mild pulmonic regurgitation.  13. Normal pericardium with no pericardial effusion.    < end of copied text >    [ ]  Catheterization:  [ ] Stress Test:    OTHER: 	        ASSESSMENT/PLAN: 	87y Female  HTN, Chol, varicose veins, stasis ulcers found with cellulitis and 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    Wound care per medicine .  ABX-   POD follow up   The incidental finding of an ASD or PFO does not require specific treatment if the patient has not had a CVA or other evidence of arterial embolization.  ** No need for further cardiac w/u   GI / DVT prophylaxis.   keep K>4, mag >2.0   D/W Dr Coreas

## 2018-02-14 NOTE — DISCHARGE NOTE ADULT - CARE PROVIDER_API CALL
DONNA Lion), Internal Medicine  [address] [place] [address]  Phone: [phone]  Fax: [phone]

## 2018-02-14 NOTE — DISCHARGE NOTE ADULT - INSTRUCTIONS
Both versions of the DASH diet include lots of whole grains, fruits, vegetables and low-fat dairy products. The DASH diet also includes some fish, poultry and legumes, and encourages a small amount of nuts and seeds a few times a week.    You can eat red meat, sweets and fats in small amounts. The DASH diet is low in saturated fat, cholesterol and total fat.    Here's a look at the recommended servings from each food group for the 2,[id number]-day DASH diet.

## 2018-02-14 NOTE — PROGRESS NOTE ADULT - SUBJECTIVE AND OBJECTIVE BOX
S : 87y year old Female seen at bedside for right leg cellulitis and left leg ulcerations.  Patient is NAD and AAO x3. Patient reports no acute events overnight.     Chief Complaint : Patient is a 87y old  Female who presents with a chief complaint of LLE cellulitis with infected ulcers (09 Feb 2018 23:11)      Patient admits to  (-) Fevers, (-) Chills, (-) Nausea, (-) Vomiting, (-) Shortness of Breath      PMH: No pertinent past medical history  No pertinent past medical history    PSH:No significant past surgical history  No significant past surgical history      Allergies:No Known Allergies      Labs:                 O:   General: Pleasant  female NAD & AOX3.    Integument:  Skin warm, dry and supple bilateral.    Right extremity erythema extending distal to proximal leg below tibia: +erythema,  no open lesions, dirty noted, hyperkeratotic and mycotic toenails noted bilaterally   Left lower extremity medial and alteral ulcerations mid calf:  - hyperkeratotic border, wound base fibrogranular, + edema, + [place]-wound erythema, - purulence, - fluctuance, - tracking/tunneling, - probe to bone.   Vascular: Dorsalis Pedis and Posterior Tibial pulses 1/4.  Capillary re-fill time greater then 3 seconds digits 1-5 bilateral.    Neuro: Protective sensation diminished to the level of the digits bilateral.  MSK: Muscle strength 5/5 all major muscle groups bilateral.    Deformity:  A: BLE cellultiis and LLE ulcerations       P:   Chart reviewed and Patient evaluated  Discussed diagnosis and treatment with patient  Wound culture show no growth   Ulcers are superficial and local application of santyl will be done daily   X-rays reviewed   TONIE show waveforms present   Continue with IV antibiotics As Per ID  WBAT bilaterally   Offloading to bilateral Heels in bed   Discussed importance of daily foot examinations and proper shoe gear and to importance of lower Fasting Blood Glucose levels.   Podiatry will follow while in house.  Wound Care Instructions: 1. Cleanse wound with sterile saline 2. Apply santyl to medial and lateral wounds on LLE 3. Apply 4x4 guaze on wound sites. 4. Apply Dry sterile dressing 5. Change daily   Podiatry stable   Follow up with Dr. Huntley at [address] [place] [address] 2nd floor Wound Care clinic on Tuesday from 10 am - 1 pm  Discussed with Dr. Huntley

## 2018-02-14 NOTE — PROGRESS NOTE ADULT - SUBJECTIVE AND OBJECTIVE BOX
M E D I C A L   A T T E N D I N G    F O L L O W    U P   N O T E                                     ------------------------------------------------------------------------------------------------    patient evaluated by me, case discussed with team, chart, medications, and physical exam reviewed, labs / tests  and vitals reviewed by me, as bellow.   Patient is stable for discharge today.  Patient to follow up with  PMD.   See discharge document for full note.      ==================>> MEDICATIONS <<====================    collagenase Ointment 1 Application(s) Topical once  Dakins Solution - 1/2 Strength 1 Application(s) Topical once  heparin  Injectable 5000 Unit(s) SubCutaneous every 8 hours  metoprolol     tartrate 50 milliGRAM(s) Oral two times a day  simvastatin 20 milliGRAM(s) Oral at bedtime  vancomycin  IVPB 1000 milliGRAM(s) IV Intermittent every 24 hours    MEDICATIONS  (PRN):  acetaminophen   Tablet 650 milliGRAM(s) Oral every 6 hours PRN For Temp greater than 38 C (100.4 F)  acetaminophen   Tablet. 650 milliGRAM(s) Oral every 8 hours PRN Mild Pain (1 - 3)    ==================>> VITAL SIGNS <<==================    T(C): 36.4 (02-14-18 @ 14:33), Max: 37.1 (02-14-18 @ 05:01)  HR: 88 (02-14-18 @ 14:33) (77 - 97)  BP: 110/57 (02-14-18 @ 14:33) (98/55 - 168/78)  RR: 16 (02-14-18 @ 14:33) (16 - 18)  SpO2: 97% (02-14-18 @ 14:33) (97% - 98%)     ==================>> LAB AND IMAGING <<==================                        10.1   6.9   )-----------( 165      ( 14 Feb 2018 08:49 )             33.4        02-14    142  |  107  |  30<H>  ----------------------------<  77  3.5   |  29  |  0.85    Ca    8.1<L>      14 Feb 2018 08:49     TSH:      2.46   (02-10-18)           Lipid profile:  (02-10-18)     Total: 109     LDL  : 45     HDL  :53     TG   :57     HgA1C: 5.7  (02-10-18)

## 2018-02-14 NOTE — DISCHARGE NOTE ADULT - FUNCTIONAL SCREEN CURRENT LEVEL: BATHING, MLM
(2) assistive person balance training/neuromuscular re-education/ROM/postural re-education/gait training/strengthening

## 2018-02-14 NOTE — PROGRESS NOTE ADULT - PROVIDER SPECIALTY LIST ADULT
Cardiology
Cardiology
Internal Medicine
Podiatry
Internal Medicine
Internal Medicine

## 2018-02-14 NOTE — DISCHARGE NOTE ADULT - HOSPITAL COURSE
Nenita Estrada is an 87F, from home (lives alone), independent at baseline, PMHx HTN, HLD. OA, varicose veins, who presented to ED s/p mechanical fall, as per patient she tripped with the bathroom rug, legs "gave out" and could not stand up due to left leg pain, thereafter, patient screamed for help and daughter came to assist her. Patient denies LOC, chest pain, palpitations, vertigo during fall event.  As per daughter, patient has been limping from left leg for a couple of days. They were not "aware" of patients leg infection prior to fall. Patient refers wearing a sock on left foot for "1 week" "because she noticed infection and thought it'll get better with sock". Patient was asked if she washes her legs when showering, to which she was not able to respond clearly. She refers that daughter helps her. Patient denies fever/chills, diarrhea. Refers pain 5/10 left leg. Patient is alert and oriented to time and space but is a poor historian.    Spoke over the phone with daughter, who was not aware of severity of leg infection. She refers that infection is 2/2 fall she had yesterday at home. Daughter was informed that infection most likely has been going on for several weeks now and that patient looks like she has not removed sock for at least 2 weeks, as it was glued to skin and feet with poor hygiene. Daughter refers that patient never reported "leg issues". Daughter denies history of peripheral vascular disease, but does report that patient was once hospitalized for leg infection several years ago.  Upon bedside assessment, patient was poorly groomed, nailbeds with dirt, bilateral lower extremities with erythema, warmth. Left leg with draining ulcers in lateral and posterior side, Nenita Estrada is an 87F, from home (lives alone), independent at baseline, PMHx HTN, HLD. OA, varicose veins, who presented to ED s/p mechanical fall, as per patient she tripped with the bathroom rug, legs "gave out" and could not stand up due to left leg pain, thereafter, patient screamed for help and daughter came to assist her. Patient denies LOC, chest pain, palpitations, vertigo during fall event.  As per daughter, patient has been limping from left leg for a couple of days. They were not "aware" of patients leg infection prior to fall. Patient refers wearing a sock on left foot for "1 week" "because she noticed infection and thought it'll get better with sock". Patient was asked if she washes her legs when showering, to which she was not able to respond clearly. She refers that daughter helps her. Patient denies fever/chills, diarrhea. Refers pain 5/10 left leg. Patient is alert and oriented to time and space but is a poor historian.    Spoke over the phone with daughter, who was not aware of severity of leg infection. She refers that infection is 2/2 fall she had yesterday at home. Daughter was informed that infection most likely has been going on for several weeks now and that patient looks like she has not removed sock for at least 2 weeks, as it was glued to skin and feet with poor hygiene. Daughter refers that patient never reported "leg issues". Daughter denies history of peripheral vascular disease, but does report that patient was once hospitalized for leg infection several years ago.  Upon bedside assessment, patient was poorly groomed, nailbeds with dirt, bilateral lower extremities with erythema, warmth. Left leg with draining ulcers in lateral and posterior side,  patient was admitted for cellulitis of right lower extremity and was treated with intravenous vancomycin. patient had x ray of foot done which suggested no fracture and bone infection. patient was evaluated by podiatry who did dressing change and wound care every day. patient's blood flow is low to both feets likely secondary to narrowing of blood vessels. patient has history of cramps in legs which is likely secondary to stenosis of those vessels in lower extremities. patient needs to continue with physical therapy and needs to follow up with podiatry in rehab facility. continue with doxycycline for 5 more days. blood cultures and culture swab from wound are negative.    was given her home dose of atenolol for blood pressure. had an echocardiogram done here which show intra aortic septal aneurysm. no intervention for that aneurysm at this point as patient has no history of stroke. as per cardiologist not intervention warranted at this point.    plan of care was discussed with patient and daughters at bed side. They understand and agree with the plan. patient will be discharged to rehab in stable condition.

## 2018-02-14 NOTE — DISCHARGE NOTE ADULT - PLAN OF CARE
resolve infection patient was admitted for cellulitis of right lower extremity and was treated with intravenous vancomycin. patient had x ray of foot done which suggested no fracture and bone infection. patient was evaluated by podiatry who did dressing change and wound care every day. patient's blood flow is low to both feets likely secondary to narrowing of blood vessels. patient has history of cramps in legs which is likely secondary to stenosis of those vessels in lower extremities. patient needs to continue with physical therapy and needs to follow up with podiatry in rehab facility. continue with doxycycline for 5 more days. blood cultures and culture swab from wound are negative. you were given your home dose of atenolol for blood pressure. you had an echocardiogram done here which show intra aortic septal aneurysm. no intervention for that aneurysm at this point as patient has no history of stroke. as per cardiologist not intervention warranted at this point.

## 2018-02-14 NOTE — DISCHARGE NOTE ADULT - PATIENT PORTAL LINK FT
You can access the [email or web address] Patient Portal, offered by [facility], by registering with the following website: [email or web address]

## 2018-02-14 NOTE — DISCHARGE NOTE ADULT - CARE PLAN
Principal Discharge DX:	Cellulitis  Goal:	resolve infection  Assessment and plan of treatment:	patient was admitted for cellulitis of right lower extremity and was treated with intravenous vancomycin. patient had x ray of foot done which suggested no fracture and bone infection. patient was evaluated by podiatry who did dressing change and wound care every day. patient's blood flow is low to both feets likely secondary to narrowing of blood vessels. patient has history of cramps in legs which is likely secondary to stenosis of those vessels in lower extremities. patient needs to continue with physical therapy and needs to follow up with podiatry in rehab facility. continue with doxycycline for 5 more days. blood cultures and culture swab from wound are negative.  Secondary Diagnosis:	Hypertension  Assessment and plan of treatment:	you were given your home dose of atenolol for blood pressure. you had an echocardiogram done here which show intra aortic septal aneurysm. no intervention for that aneurysm at this point as patient has no history of stroke. as per cardiologist not intervention warranted at this point.

## 2018-02-15 LAB
CULTURE RESULTS: SIGNIFICANT CHANGE UP
CULTURE RESULTS: SIGNIFICANT CHANGE UP
SPECIMEN SOURCE: SIGNIFICANT CHANGE UP
SPECIMEN SOURCE: SIGNIFICANT CHANGE UP

## 2020-10-14 NOTE — DISCHARGE NOTE ADULT - NURSING SECTION COMPLETE
A1c, lipid, cmp labs  today. Pt has 10/29/20 OV, asking if can re-order labs. OV: F/u for multiple issues. Has been over year since all labs. Which labs would you want in advance of appt, if any? Patient/Caregiver provided printed discharge information.

## 2020-12-04 ENCOUNTER — EMERGENCY (EMERGENCY)
Facility: HOSPITAL | Age: 85
LOS: 1 days | Discharge: HOME | End: 2020-12-04
Attending: STUDENT IN AN ORGANIZED HEALTH CARE EDUCATION/TRAINING PROGRAM
Payer: COMMERCIAL

## 2020-12-04 VITALS
RESPIRATION RATE: 18 BRPM | HEART RATE: 75 BPM | DIASTOLIC BLOOD PRESSURE: 91 MMHG | WEIGHT: 119.93 LBS | HEIGHT: 60 IN | OXYGEN SATURATION: 98 % | SYSTOLIC BLOOD PRESSURE: 183 MMHG

## 2020-12-04 LAB
APTT BLD: 35.9 SEC — HIGH (ref 27.5–35.5)
BASOPHILS # BLD AUTO: 0.03 K/UL — SIGNIFICANT CHANGE UP (ref 0–0.2)
BASOPHILS NFR BLD AUTO: 0.5 % — SIGNIFICANT CHANGE UP (ref 0–2)
EOSINOPHIL # BLD AUTO: 0.03 K/UL — SIGNIFICANT CHANGE UP (ref 0–0.5)
EOSINOPHIL NFR BLD AUTO: 0.5 % — SIGNIFICANT CHANGE UP (ref 0–6)
HCT VFR BLD CALC: 33 % — LOW (ref 34.5–45)
HGB BLD-MCNC: 10.4 G/DL — LOW (ref 11.5–15.5)
IMM GRANULOCYTES NFR BLD AUTO: 0 % — SIGNIFICANT CHANGE UP (ref 0–1.5)
INR BLD: 1.04 RATIO — SIGNIFICANT CHANGE UP (ref 0.88–1.16)
LYMPHOCYTES # BLD AUTO: 1.02 K/UL — SIGNIFICANT CHANGE UP (ref 1–3.3)
LYMPHOCYTES # BLD AUTO: 18.7 % — SIGNIFICANT CHANGE UP (ref 13–44)
MCHC RBC-ENTMCNC: 30.1 PG — SIGNIFICANT CHANGE UP (ref 27–34)
MCHC RBC-ENTMCNC: 31.5 GM/DL — LOW (ref 32–36)
MCV RBC AUTO: 95.7 FL — SIGNIFICANT CHANGE UP (ref 80–100)
MONOCYTES # BLD AUTO: 0.46 K/UL — SIGNIFICANT CHANGE UP (ref 0–0.9)
MONOCYTES NFR BLD AUTO: 8.4 % — SIGNIFICANT CHANGE UP (ref 2–14)
NEUTROPHILS # BLD AUTO: 3.92 K/UL — SIGNIFICANT CHANGE UP (ref 1.8–7.4)
NEUTROPHILS NFR BLD AUTO: 71.9 % — SIGNIFICANT CHANGE UP (ref 43–77)
NRBC # BLD: 0 /100 WBCS — SIGNIFICANT CHANGE UP (ref 0–0)
PLATELET # BLD AUTO: 122 K/UL — LOW (ref 150–400)
PROTHROM AB SERPL-ACNC: 12.3 SEC — SIGNIFICANT CHANGE UP (ref 10.6–13.6)
RBC # BLD: 3.45 M/UL — LOW (ref 3.8–5.2)
RBC # FLD: 12.8 % — SIGNIFICANT CHANGE UP (ref 10.3–14.5)
WBC # BLD: 5.46 K/UL — SIGNIFICANT CHANGE UP (ref 3.8–10.5)
WBC # FLD AUTO: 5.46 K/UL — SIGNIFICANT CHANGE UP (ref 3.8–10.5)

## 2020-12-04 PROCEDURE — 99284 EMERGENCY DEPT VISIT MOD MDM: CPT

## 2020-12-04 PROCEDURE — 85610 PROTHROMBIN TIME: CPT

## 2020-12-04 PROCEDURE — 80048 BASIC METABOLIC PNL TOTAL CA: CPT

## 2020-12-04 PROCEDURE — 99283 EMERGENCY DEPT VISIT LOW MDM: CPT

## 2020-12-04 PROCEDURE — 85025 COMPLETE CBC W/AUTO DIFF WBC: CPT

## 2020-12-04 PROCEDURE — 85730 THROMBOPLASTIN TIME PARTIAL: CPT

## 2020-12-04 RX ORDER — LABETALOL HCL 100 MG
5 TABLET ORAL ONCE
Refills: 0 | Status: COMPLETED | OUTPATIENT
Start: 2020-12-04 | End: 2020-12-04

## 2020-12-04 RX ORDER — SODIUM CHLORIDE 9 MG/ML
3 INJECTION INTRAMUSCULAR; INTRAVENOUS; SUBCUTANEOUS EVERY 8 HOURS
Refills: 0 | Status: DISCONTINUED | OUTPATIENT
Start: 2020-12-04 | End: 2020-12-08

## 2020-12-04 NOTE — ED PROVIDER NOTE - OBJECTIVE STATEMENT
89 yo F h/o HTN, HLD, OA (no ASA or other thinners) p/w atraumatic R nare epistaxis x 3 hours. Pt states that she feels well otherwise and denies any other symptoms. Pt denies recent fever or infectious symptoms/ N/V/ diarrhea, dysuria or frequency/hesitancy, chest pain, SOB, focal numbness/weakness, lightheadedness or syncope, other recent illness or hospitalizations.

## 2020-12-04 NOTE — ED PROVIDER NOTE - PHYSICAL EXAMINATION
Vital Signs Reviewed  GEN: Comfortable, Conversant, NAD, AAOx3  HEENT: Blood oozing from R nare, Clear oropharynx w/o blood, NCAT, EOMI, MMM  RESP: CTAB, No rales/rhonchi/wheezing  CV: RRR, S1S2, No murmurs  ABD: No TTP, ND, No masses, No CVA Tenderness  Extrem/Skin: Equal pulses bilat, No cyanosis/edema/rashes

## 2020-12-04 NOTE — ED ADULT NURSE NOTE - NS PRO PASSIVE SMOKE EXP
Magnesium Oxide (MAGNESIUM-OXIDE) 400 (241.3 MG) MG Tab   8/9/2016       Sig - Route: Take 1 tablet by mouth 2 times daily.  - Oral     Last med check 2/7/17    Refills provided.   No

## 2020-12-04 NOTE — ED PROVIDER NOTE - PATIENT PORTAL LINK FT
You can access the FollowMyHealth Patient Portal offered by [facility] by registering at the following website: [email or web address]. By joining [facility]’s FollowMyHealth portal, you will also be able to view your health information using other applications (apps) compatible with our system.

## 2020-12-04 NOTE — ED PROVIDER NOTE - CLINICAL SUMMARY MEDICAL DECISION MAKING FREE TEXT BOX
Pt p/w epistaxis with no constitutional symptoms. Applying pressure with labs pending. Pt stable. May give TXA + rhino. Pt p/w epistaxis with no constitutional symptoms. Applying pressure with labs pending. Pt stable. May give TXA + rhino. Pt did not require the labetolol and BP decreased spontaneously per RN.    Bleeding completely resolved with only pressure. Labs show Hb 10 and otw unremarkable. Pt remains with no contitutional symptoms. Giving ENT f/u. Most likely uncomplicated anterior epistaxis, details of case, history, and exam making a more emergent diagnosis much less likely. Discussed with pt my clinical impression and results, patient given strict return precautions if persistent or worsening of symptoms occurs, and need for close follow up. Pt well appearing with a reassuring exam. Pt expressed understanding and agrees with plan. Discharge home with PMD or Specialist f/u within 5 days.

## 2020-12-04 NOTE — ED PROVIDER NOTE - NSFOLLOWUPCLINICS_GEN_ALL_ED_FT
[facility]  ENT  [address] [place] [address]  Phone: [phone]  Fax: [phone]  Follow Up Time: 4-6 Days

## 2020-12-04 NOTE — ED PROVIDER NOTE - NSFOLLOWUPINSTRUCTIONS_ED_ALL_ED_FT
You were seen in the emergency room today for nose bleeding. Please make the next available appointment with the ENT doctors and return to the ER if you have any worsening symptoms.    We no longer feel that you need further emergency care or admission to the hospital at this time.    While we have determined that you are currently stable for discharge, we know that things can change. Please seek immediate medical attention or return to the ER if you experience any of the following:  Any worsening or persistent symptoms  Severe Pain  Chest Pain  Difficulty Breathing  Bleeding  Passing Out  Severe Rash  Inability to Eat or Drink  Persistent Fever    Please see a primary care doctor or specialist within 5 days to ensure that you are improving.    Please call the [facility] phone numbers on this document if you have any problems obtaining a follow up appointment.    I wish you well! -Dr Harvey

## 2020-12-04 NOTE — ED ADULT NURSE NOTE - INTERVENTIONS DEFINITIONS
Instruct patient to call for assistance/Non-slip footwear when patient is off stretcher/Physically safe environment: no spills, clutter or unnecessary equipment/Call bell, personal items and telephone within reach/Stretcher in lowest position, wheels locked, appropriate side rails in place/Monitor gait and stability/Room bathroom lighting operational/Monitor for mental status changes and reorient to person, place, and time/Reinforce activity limits and safety measures with patient and family

## 2020-12-05 VITALS
TEMPERATURE: 99 F | OXYGEN SATURATION: 97 % | DIASTOLIC BLOOD PRESSURE: 82 MMHG | RESPIRATION RATE: 18 BRPM | HEART RATE: 68 BPM | SYSTOLIC BLOOD PRESSURE: 145 MMHG

## 2020-12-05 LAB
ANION GAP SERPL CALC-SCNC: 6 MMOL/L — SIGNIFICANT CHANGE UP (ref 5–17)
BUN SERPL-MCNC: 51 MG/DL — HIGH (ref 7–18)
CALCIUM SERPL-MCNC: 9.1 MG/DL — SIGNIFICANT CHANGE UP (ref 8.4–10.5)
CHLORIDE SERPL-SCNC: 108 MMOL/L — SIGNIFICANT CHANGE UP (ref 96–108)
CO2 SERPL-SCNC: 27 MMOL/L — SIGNIFICANT CHANGE UP (ref 22–31)
CREAT SERPL-MCNC: 1.22 MG/DL — SIGNIFICANT CHANGE UP (ref 0.5–1.3)
GLUCOSE SERPL-MCNC: 101 MG/DL — HIGH (ref 70–99)
POTASSIUM SERPL-MCNC: 4.2 MMOL/L — SIGNIFICANT CHANGE UP (ref 3.5–5.3)
POTASSIUM SERPL-SCNC: 4.2 MMOL/L — SIGNIFICANT CHANGE UP (ref 3.5–5.3)
SODIUM SERPL-SCNC: 141 MMOL/L — SIGNIFICANT CHANGE UP (ref 135–145)

## 2021-03-17 ENCOUNTER — EMERGENCY (EMERGENCY)
Facility: HOSPITAL | Age: 86
LOS: 1 days | End: 2021-03-17
Attending: EMERGENCY MEDICINE
Payer: COMMERCIAL

## 2021-03-17 VITALS — HEIGHT: 60 IN

## 2021-03-17 PROCEDURE — 0225U NFCT DS DNA&RNA 21 SARSCOV2: CPT

## 2021-03-17 PROCEDURE — 99285 EMERGENCY DEPT VISIT HI MDM: CPT

## 2021-03-17 NOTE — ED PROVIDER NOTE - CLINICAL SUMMARY MEDICAL DECISION MAKING FREE TEXT BOX
89 year old female in cardiac arrest. PE as above.  code started 1005PM, given bicarbx2, epix5, calciumx1, 1L NS bolus, intubated. in PEA whole time. pulseless. TOD called 1020PM. daughter in ED aware. no cardiac activity on bedside US.

## 2021-03-17 NOTE — ED ADULT TRIAGE NOTE - CHIEF COMPLAINT QUOTE
As per daughter, Pt became unresponsive while driving home. Upon arrival pt pulseless, CPR in progress, ACLS as per protocol.

## 2021-03-17 NOTE — ED PROVIDER NOTE - OBJECTIVE STATEMENT
89 year old female PMh HLD coming in cardiac arrest. witnessed by daughter who states while in car the pt started to breathe heavily and then became unresponsive. daughter states pt was a little off today but states the patient didn't complain of anything.

## 2021-03-17 NOTE — ED ADULT NURSE NOTE - CHIEF COMPLAINT QUOTE
As per daughter, Pt became unresponsive while driving home. Upon arrival pt pulseless, CPR in progress, ACLS as per protocol.
negative...
old records/vital signs

## 2021-03-17 NOTE — ED ADULT NURSE NOTE - OBJECTIVE STATEMENT
Pt brought in by family in the car for witnessed cardiac arrest. Code started at 2205 (refer to Cardiac Arrest Data Sheet for more info). 5 Epi given, 1 calcium given, and 2 Bicarb given. No shock given. pronounced at 2220 by MD Perdomo

## 2021-03-18 LAB
RAPID RVP RESULT: SIGNIFICANT CHANGE UP
SARS-COV-2 RNA SPEC QL NAA+PROBE: SIGNIFICANT CHANGE UP

## 2022-05-26 NOTE — ED PROVIDER NOTE - NS ED NOTE AC HIGH RISK COUNTRIES
Patient informed of message below, verbalized understanding, and labs ordered.  She will call if she has any questions or needs refill of medications.    No daughter/children

## 2023-01-23 NOTE — H&P ADULT - PSYCHIATRIC DETAILS
Impression: Type 2 diabetes mellitus w/o complication: T04.7. Plan: Discussed diagnosis in detail with patient. Advised and emphasized patient of blood sugar control. Discussed risks of progression. Poor compliance can lead to blindness. Optos performed and reviewed with patient today. Reassured patient of condition and treatment. Will continue to observe condition and or symptoms. normal behavior/normal affect

## 2023-11-06 NOTE — ED ADULT NURSE NOTE - PAIN RATING/NUMBER SCALE (0-10): REST
Subjective:      Patient ID: Radha Chiang is a 66 y.o. female.    Chief Complaint: SLE; osteoporosis    HPI  having pain and swelling intermittently in mcps 2-5 right>left for several weeks for first time Jaccoud's arthropathy. Also left knee, and neuropathic pain feet. Still swimming regularly    Also notes SOB with exertion only and cough for last several weeks  Review of Systems   Constitutional:  Positive for fatigue (9/10). Negative for appetite change, fever and unexpected weight change.   HENT:  Negative for mouth sores.    Eyes:  Negative for visual disturbance.   Respiratory:  Positive for cough and shortness of breath. Negative for wheezing.    Cardiovascular:  Negative for chest pain and palpitations.   Gastrointestinal:  Negative for abdominal pain, anal bleeding, blood in stool, constipation, diarrhea, nausea and vomiting.   Genitourinary:  Negative for dysuria, frequency and urgency.   Musculoskeletal:  Negative for arthralgias, back pain, gait problem, joint swelling, myalgias, neck pain and neck stiffness.   Skin:  Negative for rash.   Neurological:  Negative for weakness, numbness and headaches.   Hematological:  Negative for adenopathy. Does not bruise/bleed easily.   Psychiatric/Behavioral:  Positive for sleep disturbance. The patient is not nervous/anxious.         Objective:   There were no vitals taken for this visit.  Physical Exam   Constitutional: She is oriented to person, place, and time.   HENT:   Head: Normocephalic and atraumatic.   Mouth/Throat: Oropharynx is clear and moist.   Eyes: Conjunctivae are normal.   Neck: No thyromegaly present.   Cardiovascular: Normal rate, regular rhythm and normal heart sounds. Exam reveals no gallop and no friction rub.   No murmur heard.  Pulmonary/Chest: Breath sounds normal. She has no wheezes. She has no rales. She exhibits no tenderness.   Abdominal: Soft. She exhibits no distension and no mass. There is no abdominal tenderness.    Musculoskeletal:      Cervical back: Normal range of motion and neck supple.      Comments: Jaccoud's arthropathy with active synovitis per homonculus   Lymphadenopathy:     She has no cervical adenopathy.   Neurological: She is alert and oriented to person, place, and time. She displays normal reflexes. Gait normal.   Nl motor strength UE and LE bilateral   Psychiatric: Her behavior is normal. Mood, judgment and thought content normal.               Latest Reference Range & Units 11/06/23 12:26 11/06/23 12:31   WBC 3.90 - 12.70 K/uL 5.36    RBC 4.00 - 5.40 M/uL 3.77 (L)    Hemoglobin 12.0 - 16.0 g/dL 11.6 (L)    Hematocrit 37.0 - 48.5 % 36.7 (L)    MCV 82 - 98 fL 97    MCH 27.0 - 31.0 pg 30.8    MCHC 32.0 - 36.0 g/dL 31.6 (L)    RDW 11.5 - 14.5 % 12.2    Platelet Count 150 - 450 K/uL 300    MPV 9.2 - 12.9 fL 10.5    Gran % 38.0 - 73.0 % 50.7    Lymph % 18.0 - 48.0 % 36.6    Mono % 4.0 - 15.0 % 10.3    Eosinophil % 0.0 - 8.0 % 1.1    Basophil % 0.0 - 1.9 % 1.1    Immature Granulocytes 0.0 - 0.5 % 0.2    Gran # (ANC) 1.8 - 7.7 K/uL 2.7    Lymph # 1.0 - 4.8 K/uL 2.0    Mono # 0.3 - 1.0 K/uL 0.6    Eos # 0.0 - 0.5 K/uL 0.1    Baso # 0.00 - 0.20 K/uL 0.06    Immature Grans (Abs) 0.00 - 0.04 K/uL 0.01    nRBC 0 /100 WBC 0    Differential Method  Automated    Sed Rate 0 - 36 mm/Hr 16    Sodium 136 - 145 mmol/L 142    Potassium 3.5 - 5.1 mmol/L 4.9    Chloride 95 - 110 mmol/L 109    CO2 23 - 29 mmol/L 27    Anion Gap 8 - 16 mmol/L 6 (L)    BUN 8 - 23 mg/dL 10    Creatinine 0.5 - 1.4 mg/dL 0.9    eGFR >60 mL/min/1.73 m^2 >60.0    Glucose 70 - 110 mg/dL 82    Calcium 8.7 - 10.5 mg/dL 8.9    ALP 55 - 135 U/L 133    PROTEIN TOTAL 6.0 - 8.4 g/dL 7.4    Albumin 3.5 - 5.2 g/dL 4.0    BILIRUBIN TOTAL 0.1 - 1.0 mg/dL 0.4    AST 10 - 40 U/L 26    ALT 10 - 44 U/L 15    CRP 0.0 - 8.2 mg/L 1.7    Complement (C-3) 50 - 180 mg/dL 94    Complement (C-4) 11 - 44 mg/dL 24    Specimen UA   Urine, Clean Catch   Color, UA Yellow, Straw,  Alejandra   Yellow   Appearance, UA Clear   Clear   Specific Gravity, UA 1.005 - 1.030   1.010   pH, UA 5.0 - 8.0   6.0   Protein, UA Negative   Negative   Glucose, UA Negative   Negative   Ketones, UA Negative   Negative   Occult Blood UA Negative   Negative   NITRITE UA Negative   Negative   Bilirubin (UA) Negative   Negative   Leukocytes, UA Negative   Negative   (L): Data is abnormally low  Assessment:   Osteoporosis DXA 12/8/22: FRAX hip 3.2% major 18%. Discussed alendronate which she has taken intermittently in past and tolerated. However she has taken oral bisphosphonates for > 5 years total, so best to change to denosumab. 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  Mild anemia        Plan:   Consent obtained for Lupus Microbiome study, witnessed. 2 extra tubes of blood and stool sample the latter to be mailed back  Fe/TIBC  folate  Chest x-ray  PFTs  Then decide about HRCT chest  *discuss RSV vaccine with Dr. Espinal  She says she had Covid vaccine around 9/17/23 not clear if it was old booster or new vaccine. She will check with pharmacy  Cont hydroxychloroquine 300mg  daily  Ophthalmology check due 1/11/23  Ref to OT  Diclofenac gel 1%  If no better, resume methotrexate(other options: resume azathioprine, belimumab or anifrolumab. She does not want to start just now.  Denosumab 60mg sc with renal function panel one week prior 3/28/24  Mediterranean diet , discussed, per handout  RTC  6 months with standing lupus labs.    0

## 2024-09-09 NOTE — ED PROVIDER NOTE - HIV OFFER
Previously Declined (within the last year) [CV Risk Factors and Non-Cardiac Disease] : CV risk factors and non-cardiac disease [Hyperlipidemia] : hyperlipidemia [Other: _____] : [unfilled] [Symptom and Test Evaluation] : symptom and test evaluation [Arrhythmia/ECG Abnorrmalities] : arrhythmia/ECG abnormalities [Hypertension] : hypertension